# Patient Record
Sex: MALE | Race: WHITE | NOT HISPANIC OR LATINO | Employment: FULL TIME | URBAN - METROPOLITAN AREA
[De-identification: names, ages, dates, MRNs, and addresses within clinical notes are randomized per-mention and may not be internally consistent; named-entity substitution may affect disease eponyms.]

---

## 2019-06-28 ENCOUNTER — OFFICE VISIT (OUTPATIENT)
Dept: FAMILY MEDICINE CLINIC | Facility: CLINIC | Age: 29
End: 2019-06-28

## 2019-06-28 VITALS
WEIGHT: 225 LBS | OXYGEN SATURATION: 98 % | RESPIRATION RATE: 18 BRPM | TEMPERATURE: 98.3 F | SYSTOLIC BLOOD PRESSURE: 127 MMHG | BODY MASS INDEX: 30.48 KG/M2 | HEIGHT: 72 IN | HEART RATE: 60 BPM | DIASTOLIC BLOOD PRESSURE: 75 MMHG

## 2019-06-28 DIAGNOSIS — F43.10 PTSD (POST-TRAUMATIC STRESS DISORDER): ICD-10-CM

## 2019-06-28 DIAGNOSIS — F41.9 ANXIETY AND DEPRESSION: Primary | ICD-10-CM

## 2019-06-28 DIAGNOSIS — M54.42 CHRONIC LEFT-SIDED LOW BACK PAIN WITH LEFT-SIDED SCIATICA: ICD-10-CM

## 2019-06-28 DIAGNOSIS — F32.A ANXIETY AND DEPRESSION: Primary | ICD-10-CM

## 2019-06-28 DIAGNOSIS — G89.29 CHRONIC LEFT-SIDED LOW BACK PAIN WITH LEFT-SIDED SCIATICA: ICD-10-CM

## 2019-06-28 DIAGNOSIS — M54.32 SCIATIC NERVE PAIN, LEFT: ICD-10-CM

## 2019-06-28 PROBLEM — M54.9 BACK PAIN: Status: ACTIVE | Noted: 2018-05-16

## 2019-06-28 PROBLEM — K21.9 GERD (GASTROESOPHAGEAL REFLUX DISEASE): Status: ACTIVE | Noted: 2018-05-16

## 2019-06-28 PROCEDURE — 99214 OFFICE O/P EST MOD 30 MIN: CPT | Performed by: FAMILY MEDICINE

## 2019-06-28 RX ORDER — CITALOPRAM 40 MG/1
1 TABLET ORAL DAILY
Refills: 1 | COMMUNITY
Start: 2019-04-11 | End: 2019-06-28 | Stop reason: SDUPTHER

## 2019-06-28 RX ORDER — CITALOPRAM 40 MG/1
40 TABLET ORAL DAILY
Qty: 90 TABLET | Refills: 1 | Status: SHIPPED | OUTPATIENT
Start: 2019-06-28 | End: 2019-12-06 | Stop reason: SDUPTHER

## 2019-06-28 RX ORDER — MELOXICAM 15 MG/1
15 TABLET ORAL DAILY
Qty: 90 TABLET | Refills: 1 | Status: SHIPPED | OUTPATIENT
Start: 2019-06-28 | End: 2020-01-03

## 2019-06-28 RX ORDER — BUPROPION HYDROCHLORIDE 300 MG/1
1 TABLET ORAL DAILY
Refills: 5 | COMMUNITY
Start: 2019-05-20 | End: 2019-06-28 | Stop reason: SDUPTHER

## 2019-06-28 RX ORDER — CLONAZEPAM 1 MG/1
1 TABLET ORAL 3 TIMES DAILY PRN
COMMUNITY
End: 2019-06-28 | Stop reason: SDUPTHER

## 2019-06-28 RX ORDER — MELOXICAM 15 MG/1
1 TABLET ORAL DAILY
Refills: 3 | COMMUNITY
Start: 2019-05-20 | End: 2019-06-28 | Stop reason: SDUPTHER

## 2019-06-28 RX ORDER — BUPROPION HYDROCHLORIDE 300 MG/1
300 TABLET ORAL DAILY
Qty: 90 TABLET | Refills: 1 | Status: SHIPPED | OUTPATIENT
Start: 2019-06-28 | End: 2019-12-06

## 2019-06-28 RX ORDER — CLONAZEPAM 1 MG/1
1 TABLET ORAL 3 TIMES DAILY PRN
Qty: 60 TABLET | Refills: 3 | Status: SHIPPED | OUTPATIENT
Start: 2019-06-28 | End: 2019-12-06 | Stop reason: SDUPTHER

## 2019-06-28 NOTE — PROGRESS NOTES
Patient presents into office this day for a follow up on his anxiety and depression. Prior patient of [facility]. Records obtained. States that he ran out of his Clonazepam a long time ago. He has been out of his Wellbutrin x 1 week, and his other meds have been out for approx 2 months. PHQ 9 today resulted a score of 14.    Subjective   Micah Nettles is a 29 y.o. male.   Chief Complaint   Patient presents with   • Anxiety   • Depression         History of Present Illness     Micah presents for first time in about 1 year for follow-up on his anxiety and depression.  Since last visit with me he changed jobs and does not currently have insurance.  He is working a new job in [place] and is attempting to get HIP Medicaid.  He has recently run out and all of his medications was recently out of Wellbutrin possibly 1 week, Celexa, meloxicam he ran out about 2 months ago.  Klonopin has been quite a while ago.  He reports he is doing well set of medications.  He has had increased stress recently with his grandmother and poor health.  He is requesting to start medications as he was using previously.  He has graduated from court ordered counseling, he is continuing to go to counseling at [facility] for  PTSD.    He reports he continues to have pain in his left sciatic region, is requesting to restart meloxicam.    Patient Active Problem List    Diagnosis Date Noted   • Anxiety and depression 05/16/2018   • PTSD (post-traumatic stress disorder) 05/16/2018   • Back pain 05/16/2018   • GERD (gastroesophageal reflux disease) 05/16/2018           History reviewed. No pertinent surgical history.    Current Outpatient Medications:   •  clonazePAM (KlonoPIN) 1 MG tablet, Take 1 tablet by mouth 3 (Three) Times a Day As Needed for Anxiety., Disp: 60 tablet, Rfl: 3  •  EPINEPHrine (EPIPEN 2-BOO IJ), Inject 0.3 mg as directed Daily As Needed. Use as directed IM for bee stings, Disp: , Rfl:   •  buPROPion XL (WELLBUTRIN XL)  "300 MG 24 hr tablet, Take 1 tablet by mouth Daily., Disp: 90 tablet, Rfl: 1  •  citalopram (CeleXA) 40 MG tablet, Take 1 tablet by mouth Daily., Disp: 90 tablet, Rfl: 1  •  meloxicam (MOBIC) 15 MG tablet, Take 1 tablet by mouth Daily. 1 po qd with food, Disp: 90 tablet, Rfl: 1  Allergies   Allergen Reactions   • Bee Venom Anaphylaxis     Social History     Socioeconomic History   • Marital status: Single     Spouse name: Not on file   • Number of children: Not on file   • Years of education: Not on file   • Highest education level: Not on file   Tobacco Use   • Smoking status: Current Every Day Smoker     Packs/day: 0.25     Types: Cigarettes     Start date: 2007   • Smokeless tobacco: Never Used   Substance and Sexual Activity   • Alcohol use: No     Frequency: Never   • Drug use: No     Family History   Problem Relation Age of Onset   • Migraines Mother    • Diabetes Mother    • Alcohol abuse Father    • Hypertension Father    • Cervical cancer Sister      The following portions of the patient's history were reviewed and updated as appropriate: allergies, current medications, past family history, past medical history, past social history, past surgical history and problem list.    Review of Systems   Gastrointestinal: Negative for abdominal pain, nausea, vomiting and indigestion.   Musculoskeletal: Positive for arthralgias and back pain. Negative for gait problem and joint swelling.   Psychiatric/Behavioral: Positive for agitation, decreased concentration, dysphoric mood, sleep disturbance, depressed mood and stress. Negative for suicidal ideas. The patient is nervous/anxious.        Objective   /75 (BP Location: Left arm, Patient Position: Sitting)   Pulse 60   Temp 98.3 °F (36.8 °C) (Oral)   Resp 18   Ht 182.9 cm (72\")   Wt 102 kg (225 lb)   SpO2 98%   BMI 30.52 kg/m²   Physical Exam   Constitutional: He is oriented to person, place, and time. He appears well-developed and well-nourished. No " distress.   HENT:   Head: Normocephalic and atraumatic.   Eyes: Conjunctivae and EOM are normal. Pupils are equal, round, and reactive to light.   Neck: Normal range of motion. Neck supple. No thyromegaly present.   Cardiovascular: Normal rate and normal heart sounds. Exam reveals no gallop and no friction rub.   No murmur heard.  Pulmonary/Chest: No respiratory distress. He has no wheezes. He has no rales.   Musculoskeletal: He exhibits no edema or tenderness.   Lymphadenopathy:     He has no cervical adenopathy.   Neurological: He is alert and oriented to person, place, and time. Coordination normal.   Skin: Skin is warm and dry. No rash noted.   Psychiatric: He has a normal mood and affect. His behavior is normal. Judgment and thought content normal.         No results found for any previous visit.         Assessment/Plan   Diagnoses and all orders for this visit:    1. Anxiety and depression (Primary)    2. PTSD (post-traumatic stress disorder)    3. Chronic left-sided low back pain with left-sided sciatica    4. Sciatic nerve pain, left    Other orders  -     buPROPion XL (WELLBUTRIN XL) 300 MG 24 hr tablet; Take 1 tablet by mouth Daily.  Dispense: 90 tablet; Refill: 1  -     citalopram (CeleXA) 40 MG tablet; Take 1 tablet by mouth Daily.  Dispense: 90 tablet; Refill: 1  -     meloxicam (MOBIC) 15 MG tablet; Take 1 tablet by mouth Daily. 1 po qd with food  Dispense: 90 tablet; Refill: 1  -     clonazePAM (KlonoPIN) 1 MG tablet; Take 1 tablet by mouth 3 (Three) Times a Day As Needed for Anxiety.  Dispense: 60 tablet; Refill: 3      Restart medications           Return in about 3 months (around 9/28/2019) for Recheck.

## 2019-06-28 NOTE — PATIENT INSTRUCTIONS
Living With Post-Traumatic Stress Disorder  If you have been diagnosed with post-traumatic stress disorder (PTSD), you may be relieved that you now know why you have felt or behaved a certain way. Still, you may feel overwhelmed about the treatment ahead. You may also wonder how to get the support you need and how to deal with the condition day-to-day.  If you are living with PTSD, there are ways to help you recover from it and manage your symptoms.  How to manage lifestyle changes  Managing stress  Stress is your body's reaction to life changes and events, both good and bad. Stress can make PTSD worse. Take the following steps to cope with stress:  · Talk with your health care provider or a counselor if you would like to learn more about techniques to reduce your stress. He or she may suggest some stress reduction techniques such as:  ? Muscle relaxation exercises.  ? Regular exercise.  ? Meditation, yoga, or other mind-body exercises.  ? Breathing exercises.  ? Listening to quiet music.  ? Spending time outside.  · Maintain a healthy lifestyle. Eat a healthy diet, exercise regularly, get plenty of sleep, and take time to relax.  · Spend time with others. Talk with them about how you are feeling and what kind of support you need. Try to not isolate yourself, even though you may feel like doing that. Isolating yourself can delay your recovery.  · Do activities and hobbies that you enjoy.  · Pace yourself when doing stressful things. Take breaks, and reward yourself when you finish. Make sure that you do not overload your schedule.    Medicines  Your health care provider may suggest certain medicines if he or she feels that they will help to improve your condition. Antidepressants or antipsychotic medicines may be used to treat PTSD. Avoid using alcohol and other substances that may prevent your medicines from working properly (may interact). It is also important to:  · Talk with your pharmacist or health care  provider about all medicines that you take, their possible side effects, and which medicines are safe to take together.  · Make it your goal to take part in all treatment decisions (shared decision-making). Ask about possible side effects of medicines that your health care provider recommends, and tell him or her how you feel about having those side effects. It is best if shared decision-making with your health care provider is part of your total treatment plan.    If your health care provider prescribes a medicine, you may not notice the full benefits of it for 4-8 weeks. Most people who are treated for PTSD need to take medicine for at least 6-12 months after they feel better. If you are taking medicines as part of your treatment, do not stop taking medicines before you ask your health care provider if it is safe to stop. You may need to have the medicine slowly decreased (tapered) over time to lower the risk of harmful side effects.  Relationships  Many people who have PTSD have difficulty trusting others. Make an effort to:  · Take risks and develop trust with close friends and family members. Developing trust in others can help you feel safe and connect you with emotional support.  · Be open and honest about your feelings.  · Try to have fun and relax in safe spaces, such as with friends and family.  · Think about going to couples counseling, family education classes, or family therapy. Your loved ones may not always know how to be supportive. Therapy can be helpful for everyone.    How to recognize changes in your condition  Be aware of your symptoms and how often you have them. The following symptoms mean that you need to seek help for your PTSD:  · You feel suspicious and angry.  · You have repeated flashbacks.  · You avoid going out or being with others.  · You have an increasing number of fights with close friends or family members, such as your spouse.  · You have thoughts about hurting yourself or  others.  · You cannot get relief from feelings of depression or anxiety.    Where to find support  Talking to others  · Explain that PTSD is a mental health problem. It is something that a person can develop after experiencing or seeing a life-threatening event. Tell them that PTSD makes you feel stress like you did during the event.  · Talk to your loved ones about the symptoms you have. Also tell them what things or situations can cause symptoms to start (are triggers for you).  · Assure your loved ones that there are treatments to help PTSD. Discuss possibly seeking family therapy or couples therapy.  · If you are worried or fearful about seeking treatment, ask for support.  Finances  Not all insurance plans cover mental health care, so it is important to check with your insurance carrier. If paying for co-pays or counseling services is a problem, search for a local or [facility] mental health care center. Public mental health care services may be offered there at a low cost or no cost when you are not able to see a private health care provider. If you are a , contact a local veterans organization or [facility] for more information.  If you are taking medicine for PTSD, you may be able to get the genericform, which may be less expensive than brand-name medicine. Some makers of prescription medicines also offer help to patients who cannot afford the medicines that they need.  Community resources  · Find a support group in your community. Often, groups are available for  veterans, trauma victims, and family members or caregivers.  · Look into volunteer opportunities. Taking part in these can help you feel more connected to your community.  · Contact a local organization to find out if you are eligible for a service dog.  · Keep daily contact with at least one trusted friend or family member.  Follow these instructions at home:  Lifestyle  · Exercise regularly. Try to do 30 or more minutes of  physical activity on most days of the week.  · Try to get 7-9 hours of sleep each night. To help with sleep:  ? Keep your bedroom cool and dark.  ? Do not eat a heavy meal during the hour before you go to bed.  ? Do not drink alcohol or caffeinated drinks before bed.  ? Avoid screen time before bedtime. This means avoiding use of your TV, computer, tablet, and cell phone.  · Avoid using alcohol or drugs.  · Practice self-soothing skills and use them daily.  · Try to have fun and seek humor in your life.  General instructions  · If your PTSD is affecting your marriage or family, seek help from a family therapist.  · Take over-the-counter and prescription medicines only as told by your health care provider.  · Make sure to let all of your health care providers know that you have PTSD. This is especially important if you are having surgery or need to be admitted to the hospital.  · Keep all follow-up visits as told by your health care providers. This is important.  Where to find more information  Go to this website to find more information about PTSD, treatment for PTSD, and how to get support:  · National Center for PTSD: www.ptsd.va.gov    Contact a health care provider if:  · Your symptoms get worse or they do not get better.  Get help right away if:  · You have thoughts about hurting yourself or others.  If you ever feel like you may hurt yourself or others, or have thoughts about taking your own life, get help right away. You can go to your nearest emergency department or call:  · Your local emergency services (911 in the [place]  · A suicide crisis helpline, such as the National Suicide Prevention Lifeline at 1-567.212.9708. This is open 24-hours a day.    Summary  · If you are living with PTSD, there are ways to help you recover from it and manage your symptoms.  · Find supportive environments and people who understand PTSD. Spend time in those places, and maintain contact with those people.  · Work with your  health care team to create a management plan that includes counseling, stress management techniques, and healthy lifestyle habits.  This information is not intended to replace advice given to you by your health care provider. Make sure you discuss any questions you have with your health care provider.  Document Released: 04/19/2018 Document Revised: 04/19/2018 Document Reviewed: 04/19/2018  [employer] Interactive Patient Education © 2019 [employer]

## 2019-12-06 ENCOUNTER — OFFICE VISIT (OUTPATIENT)
Dept: FAMILY MEDICINE CLINIC | Facility: CLINIC | Age: 29
End: 2019-12-06

## 2019-12-06 VITALS
HEART RATE: 56 BPM | OXYGEN SATURATION: 98 % | HEIGHT: 72 IN | DIASTOLIC BLOOD PRESSURE: 90 MMHG | TEMPERATURE: 98.3 F | SYSTOLIC BLOOD PRESSURE: 139 MMHG | BODY MASS INDEX: 28.58 KG/M2 | WEIGHT: 211 LBS

## 2019-12-06 DIAGNOSIS — G89.29 CHRONIC LEFT-SIDED LOW BACK PAIN WITH LEFT-SIDED SCIATICA: ICD-10-CM

## 2019-12-06 DIAGNOSIS — F32.A ANXIETY AND DEPRESSION: Primary | ICD-10-CM

## 2019-12-06 DIAGNOSIS — F43.10 PTSD (POST-TRAUMATIC STRESS DISORDER): ICD-10-CM

## 2019-12-06 DIAGNOSIS — F41.9 ANXIETY AND DEPRESSION: Primary | ICD-10-CM

## 2019-12-06 DIAGNOSIS — M54.42 CHRONIC LEFT-SIDED LOW BACK PAIN WITH LEFT-SIDED SCIATICA: ICD-10-CM

## 2019-12-06 PROCEDURE — 99214 OFFICE O/P EST MOD 30 MIN: CPT | Performed by: FAMILY MEDICINE

## 2019-12-06 RX ORDER — CLONAZEPAM 1 MG/1
1 TABLET ORAL 3 TIMES DAILY PRN
Qty: 180 TABLET | Refills: 1 | Status: SHIPPED | OUTPATIENT
Start: 2019-12-06 | End: 2020-11-25 | Stop reason: SDUPTHER

## 2019-12-06 RX ORDER — CITALOPRAM 40 MG/1
40 TABLET ORAL DAILY
Qty: 90 TABLET | Refills: 1 | Status: SHIPPED | OUTPATIENT
Start: 2019-12-06 | End: 2020-11-25 | Stop reason: SDUPTHER

## 2019-12-06 RX ORDER — BUPROPION HYDROCHLORIDE 150 MG/1
150 TABLET, EXTENDED RELEASE ORAL 2 TIMES DAILY
Qty: 180 TABLET | Refills: 3 | Status: SHIPPED | OUTPATIENT
Start: 2019-12-06 | End: 2020-11-25 | Stop reason: ALTCHOICE

## 2020-01-02 DIAGNOSIS — G89.29 CHRONIC LEFT-SIDED LOW BACK PAIN WITH LEFT-SIDED SCIATICA: ICD-10-CM

## 2020-01-02 DIAGNOSIS — M54.42 CHRONIC LEFT-SIDED LOW BACK PAIN WITH LEFT-SIDED SCIATICA: ICD-10-CM

## 2020-01-03 RX ORDER — MELOXICAM 15 MG/1
TABLET ORAL
Qty: 90 TABLET | Refills: 3 | Status: SHIPPED | OUTPATIENT
Start: 2020-01-03 | End: 2020-11-25 | Stop reason: SDUPTHER

## 2020-11-25 ENCOUNTER — OFFICE VISIT (OUTPATIENT)
Dept: FAMILY MEDICINE CLINIC | Facility: CLINIC | Age: 30
End: 2020-11-25

## 2020-11-25 VITALS
HEART RATE: 63 BPM | OXYGEN SATURATION: 97 % | TEMPERATURE: 98.7 F | BODY MASS INDEX: 28.31 KG/M2 | DIASTOLIC BLOOD PRESSURE: 77 MMHG | SYSTOLIC BLOOD PRESSURE: 127 MMHG | WEIGHT: 209 LBS | HEIGHT: 72 IN

## 2020-11-25 DIAGNOSIS — F32.A ANXIETY AND DEPRESSION: Primary | ICD-10-CM

## 2020-11-25 DIAGNOSIS — K21.9 GASTROESOPHAGEAL REFLUX DISEASE, UNSPECIFIED WHETHER ESOPHAGITIS PRESENT: ICD-10-CM

## 2020-11-25 DIAGNOSIS — M54.42 CHRONIC LEFT-SIDED LOW BACK PAIN WITH LEFT-SIDED SCIATICA: ICD-10-CM

## 2020-11-25 DIAGNOSIS — Z23 NEED FOR INFLUENZA VACCINATION: ICD-10-CM

## 2020-11-25 DIAGNOSIS — R11.0 NAUSEA: ICD-10-CM

## 2020-11-25 DIAGNOSIS — F41.9 ANXIETY AND DEPRESSION: Primary | ICD-10-CM

## 2020-11-25 DIAGNOSIS — F43.10 PTSD (POST-TRAUMATIC STRESS DISORDER): ICD-10-CM

## 2020-11-25 DIAGNOSIS — G89.29 CHRONIC LEFT-SIDED LOW BACK PAIN WITH LEFT-SIDED SCIATICA: ICD-10-CM

## 2020-11-25 PROCEDURE — 90471 IMMUNIZATION ADMIN: CPT | Performed by: FAMILY MEDICINE

## 2020-11-25 PROCEDURE — 99214 OFFICE O/P EST MOD 30 MIN: CPT | Performed by: FAMILY MEDICINE

## 2020-11-25 PROCEDURE — 90686 IIV4 VACC NO PRSV 0.5 ML IM: CPT | Performed by: FAMILY MEDICINE

## 2020-11-25 RX ORDER — ONDANSETRON 4 MG/1
4 TABLET, FILM COATED ORAL EVERY 8 HOURS PRN
Qty: 30 TABLET | Refills: 1 | Status: SHIPPED | OUTPATIENT
Start: 2020-11-25 | End: 2021-07-13 | Stop reason: SDUPTHER

## 2020-11-25 RX ORDER — BUPROPION HYDROCHLORIDE 150 MG/1
TABLET ORAL
Qty: 180 TABLET | Refills: 0 | Status: SHIPPED | OUTPATIENT
Start: 2020-11-25 | End: 2021-02-26 | Stop reason: SDUPTHER

## 2020-11-25 RX ORDER — FAMOTIDINE 40 MG/1
40 TABLET, FILM COATED ORAL DAILY
Qty: 90 TABLET | Refills: 3 | Status: SHIPPED | OUTPATIENT
Start: 2020-11-25 | End: 2021-02-26 | Stop reason: SDUPTHER

## 2020-11-25 RX ORDER — MELOXICAM 15 MG/1
15 TABLET ORAL DAILY
Qty: 90 TABLET | Refills: 3 | Status: SHIPPED | OUTPATIENT
Start: 2020-11-25 | End: 2021-08-02

## 2020-11-25 RX ORDER — BUPROPION HYDROCHLORIDE 150 MG/1
150 TABLET, EXTENDED RELEASE ORAL 2 TIMES DAILY
Qty: 180 TABLET | Refills: 3 | Status: CANCELLED | OUTPATIENT
Start: 2020-11-25

## 2020-11-25 RX ORDER — CLONAZEPAM 1 MG/1
1 TABLET ORAL 3 TIMES DAILY PRN
Qty: 135 TABLET | Refills: 1 | Status: SHIPPED | OUTPATIENT
Start: 2020-11-25 | End: 2021-07-13 | Stop reason: SDUPTHER

## 2020-11-25 RX ORDER — SUCRALFATE 1 G/1
1 TABLET ORAL 4 TIMES DAILY
Qty: 360 TABLET | Refills: 0 | Status: SHIPPED | OUTPATIENT
Start: 2020-11-25 | End: 2021-09-14 | Stop reason: SDUPTHER

## 2020-11-25 RX ORDER — CITALOPRAM 40 MG/1
40 TABLET ORAL DAILY
Qty: 90 TABLET | Refills: 1 | Status: SHIPPED | OUTPATIENT
Start: 2020-11-25 | End: 2021-07-13 | Stop reason: SDUPTHER

## 2020-11-25 NOTE — PROGRESS NOTES
Chief Complaint   Patient presents with   • Anxiety       Subjective     Micah Wes Nettles is a 30 y.o. male. Patient here to follow up on anxiety, states has been out of medications for about 2 months, due to no health insurance and is asking to restart medicines.  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  He is mostly working by himself at a computer, currently working 10-hour days 50 hours a week.  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  But he was still have flares and is requesting a prescription for meloxicam.    Thought was doing ok with his anxiety, however approximately 2 weeks after running out of medication, anxiety increased significantly, states he is currently always on edge and over thinking everything.  Patient has completed court ordered counseling, has been misinformed that he could continue counseling after he completed the program and is currently financially responsible for 2 additional visits.  He does not plan to return to counseling at this time.    Patient complaining of when he wakes up in the morning he is feeling nauseated, and that has been going on for about a month. Doesn't know if this could be due to his anxiety and being out of medications or if anything else was going on.  He is worried he could be developing an ulcer.  He was told in the hospital he may have had an ulcer but did not complete the work-up.  PHQ-9 Depression Screening  Little interest or pleasure in doing things? 3   Feeling down, depressed, or hopeless? 1   Trouble falling or staying asleep, or sleeping too much? 3   Feeling tired or having little energy? 3   Poor appetite or overeating? 2   Feeling bad about yourself - or that you are a failure or have let yourself or your family down? 1   Trouble concentrating on things, such as reading the newspaper or watching television? 0   Moving or speaking so slowly  that other people could have noticed? Or the opposite - being so fidgety or restless that you have been moving around a lot more than usual? 0   Thoughts that you would be better off dead, or of hurting yourself in some way? 1   PHQ-9 Total Score 14   If you checked off any problems, how difficult have these problems made it for you to do your work, take care of things at home, or get along with other people? Very difficult         The following portions of the patient's history were reviewed and updated as appropriate: allergies, current medications, past family history, past medical history, past social history, past surgical history and problem list.    Current Outpatient Medications on File Prior to Visit   Medication Sig   • EPINEPHrine (EPIPEN 2-BOO IJ) Inject 0.3 mg as directed Daily As Needed. Use as directed IM for bee stings     No current facility-administered medications on file prior to visit.      Medications Discontinued During This Encounter   Medication Reason   • buPROPion SR (WELLBUTRIN SR) 150 MG 12 hr tablet Alternate therapy   • clonazePAM (KlonoPIN) 1 MG tablet Reorder   • citalopram (CeleXA) 40 MG tablet Reorder   • meloxicam (MOBIC) 15 MG tablet Reorder       Review of Systems   Constitutional: Negative for unexpected weight loss.   Respiratory: Negative for cough and shortness of breath.    Cardiovascular: Negative for chest pain and palpitations.   Gastrointestinal: Negative for abdominal pain, nausea, vomiting and indigestion.   Musculoskeletal: Positive for arthralgias and back pain. Negative for gait problem and joint swelling.   Neurological: Negative for weakness.   Psychiatric/Behavioral: Positive for agitation, decreased concentration, dysphoric mood, sleep disturbance, depressed mood and stress. Negative for suicidal ideas. The patient is nervous/anxious.         Objective   Vitals:    11/25/20 1127   BP: 127/77   BP Location: Left arm   Patient Position: Sitting   Cuff Size: Adult  "  Pulse: 63   Temp: 98.7 °F (37.1 °C)   TempSrc: Infrared   SpO2: 97%   Weight: 94.8 kg (209 lb)   Height: 182.9 cm (72.01\")      Body mass index is 28.34 kg/m².    Physical Exam        No results found for: HGBA1C     Procedures     Assessment/Plan   Diagnoses and all orders for this visit:    1. Anxiety and depression (Primary)  -     citalopram (CeleXA) 40 MG tablet; Take 1 tablet by mouth Daily.  Dispense: 90 tablet; Refill: 1  -     clonazePAM (KlonoPIN) 1 MG tablet; Take 1 tablet by mouth 3 (Three) Times a Day As Needed for Anxiety.  Dispense: 135 tablet; Refill: 1  -     buPROPion XL (Wellbutrin XL) 150 MG 24 hr tablet; 1 daily for 3 days then 2 daily  Dispense: 180 tablet; Refill: 0    2. PTSD (post-traumatic stress disorder)  -     citalopram (CeleXA) 40 MG tablet; Take 1 tablet by mouth Daily.  Dispense: 90 tablet; Refill: 1  -     clonazePAM (KlonoPIN) 1 MG tablet; Take 1 tablet by mouth 3 (Three) Times a Day As Needed for Anxiety.  Dispense: 135 tablet; Refill: 1  -     buPROPion XL (Wellbutrin XL) 150 MG 24 hr tablet; 1 daily for 3 days then 2 daily  Dispense: 180 tablet; Refill: 0    3. Chronic left-sided low back pain with left-sided sciatica  -     meloxicam (MOBIC) 15 MG tablet; Take 1 tablet by mouth Daily. with food.  Dispense: 90 tablet; Refill: 3    4. Gastroesophageal reflux disease, unspecified whether esophagitis present  -     famotidine (Pepcid) 40 MG tablet; Take 1 tablet by mouth Daily.  Dispense: 90 tablet; Refill: 3  -     sucralfate (Carafate) 1 g tablet; Take 1 tablet by mouth 4 (Four) Times a Day.  Dispense: 360 tablet; Refill: 0    5. Nausea  -     ondansetron (Zofran) 4 MG tablet; Take 1 tablet by mouth Every 8 (Eight) Hours As Needed for Nausea or Vomiting.  Dispense: 30 tablet; Refill: 1  -     sucralfate (Carafate) 1 g tablet; Take 1 tablet by mouth 4 (Four) Times a Day.  Dispense: 360 tablet; Refill: 0    6. Need for influenza vaccination  -     Fluarix/Fluzone/Afluria Quad>6 " Months    Other orders  -     Cancel: buPROPion SR (Wellbutrin SR) 150 MG 12 hr tablet; Take 1 tablet by mouth 2 (Two) Times a Day.  Dispense: 180 tablet; Refill: 3      Restarting antidepressants with Wellbutrin 150 for 3 days then increase to 300 mg daily, changing to XR version as this is been more helpful than the SR version that he was more recently taken.  After 1 week restart Celexa at half dose for 3 to 10 days until any GI side effects  (diarrhea when started previously ) resolved and then increase to 40 mg daily.  Starting Pepcid for GERD and Carafate for the possibility of gastric ulcer, advised if GI symptoms do not quickly improve may need referral to GI for EGD.  Advised to avoid all over-the-counter NSAIDs and only use meloxicam sparingly if it does not increase nausea or stomach discomfort.    Medications Discontinued During This Encounter   Medication Reason   • buPROPion SR (WELLBUTRIN SR) 150 MG 12 hr tablet Alternate therapy   • clonazePAM (KlonoPIN) 1 MG tablet Reorder   • citalopram (CeleXA) 40 MG tablet Reorder   • meloxicam (MOBIC) 15 MG tablet Reorder          Return in about 3 months (around 2/25/2021) for Recheck, depression, anxiety reflux.  For as needed increased or new symptoms.  Education reference material relevant to visit available in AVS through [employer] or printed copy given.

## 2021-02-26 ENCOUNTER — OFFICE VISIT (OUTPATIENT)
Dept: FAMILY MEDICINE CLINIC | Facility: CLINIC | Age: 31
End: 2021-02-26

## 2021-02-26 VITALS
HEART RATE: 77 BPM | HEIGHT: 72 IN | OXYGEN SATURATION: 97 % | TEMPERATURE: 97.1 F | BODY MASS INDEX: 28.99 KG/M2 | WEIGHT: 214 LBS | SYSTOLIC BLOOD PRESSURE: 125 MMHG | RESPIRATION RATE: 16 BRPM | DIASTOLIC BLOOD PRESSURE: 74 MMHG

## 2021-02-26 DIAGNOSIS — L91.8 SKIN TAGS, MULTIPLE ACQUIRED: ICD-10-CM

## 2021-02-26 DIAGNOSIS — F43.10 PTSD (POST-TRAUMATIC STRESS DISORDER): ICD-10-CM

## 2021-02-26 DIAGNOSIS — F41.9 ANXIETY AND DEPRESSION: Primary | ICD-10-CM

## 2021-02-26 DIAGNOSIS — G89.29 CHRONIC LEFT-SIDED LOW BACK PAIN WITH LEFT-SIDED SCIATICA: ICD-10-CM

## 2021-02-26 DIAGNOSIS — K21.9 GASTROESOPHAGEAL REFLUX DISEASE, UNSPECIFIED WHETHER ESOPHAGITIS PRESENT: ICD-10-CM

## 2021-02-26 DIAGNOSIS — L98.9 SKIN LESION: ICD-10-CM

## 2021-02-26 DIAGNOSIS — F32.A ANXIETY AND DEPRESSION: Primary | ICD-10-CM

## 2021-02-26 DIAGNOSIS — R10.13 EPIGASTRIC PAIN: ICD-10-CM

## 2021-02-26 DIAGNOSIS — M54.42 CHRONIC LEFT-SIDED LOW BACK PAIN WITH LEFT-SIDED SCIATICA: ICD-10-CM

## 2021-02-26 PROCEDURE — 99214 OFFICE O/P EST MOD 30 MIN: CPT | Performed by: FAMILY MEDICINE

## 2021-02-26 RX ORDER — BUPROPION HYDROCHLORIDE 450 MG/1
TABLET, FILM COATED, EXTENDED RELEASE ORAL
Qty: 30 TABLET | Refills: 3 | Status: SHIPPED | OUTPATIENT
Start: 2021-02-26 | End: 2021-07-13 | Stop reason: SDUPTHER

## 2021-02-26 RX ORDER — OMEPRAZOLE 40 MG/1
40 CAPSULE, DELAYED RELEASE ORAL DAILY
Qty: 30 CAPSULE | Refills: 3 | Status: SHIPPED | OUTPATIENT
Start: 2021-02-26 | End: 2021-07-13 | Stop reason: SDUPTHER

## 2021-02-26 RX ORDER — FAMOTIDINE 40 MG/1
40 TABLET, FILM COATED ORAL DAILY
Qty: 30 TABLET | Refills: 3 | Status: SHIPPED | OUTPATIENT
Start: 2021-02-26 | End: 2021-07-13 | Stop reason: SDUPTHER

## 2021-02-26 NOTE — PROGRESS NOTES
Chief Complaint   Patient presents with   • Depression   • Anxiety   • Heartburn   • Cyst   • Back Pain       Subjective     Micah Wes Nettles is a 30 y.o. male.  He is presenting today to follow up on depression, anxiety, reflux, back pain and spot on his belt line.  At last visit 3 months ago we restarted Wellbutrin and he has titrated up to 150 mg two daily for total dose of 300 mg daily he states this is helping somewhat with depression and significantly with anxiety.  Would like to change this to once daily dosing.  Week later restarted Celexa which has been increased to 40 mg daily.       GERD and possible gastric ulcer, started on Pepcid and Carafate and advised to avoid all over-the-counter NSAIDs and only use meloxicam sparingly.  He states he has not been taking meloxicam at all, back pain has pretty much resolved for the present time.   Patient states his reflux is worse. He states he is not eating due to it. He almost lost his job because of it.  Reviewing medications he states he has not been taking Pepcid or any other antacids.  He will take Tums on occasion on bad days up to 6 in a day, would have taken more if someone had told him he could cause a kidney stone.     He has also noticed spots below belt line for a while. He thought it first it was an ingrown hair, took tweezers and it ended up pulling the whole lesion off and it bled quite a bit but did not find a hair.  States they have multiplied.       PHQ-9 Depression Screening  Little interest or pleasure in doing things? 3   Feeling down, depressed, or hopeless? 2   Trouble falling or staying asleep, or sleeping too much? 3   Feeling tired or having little energy? 3   Poor appetite or overeating? 3   Feeling bad about yourself - or that you are a failure or have let yourself or your family down? 3   Trouble concentrating on things, such as reading the newspaper or watching television? 0   Moving or speaking so slowly that other people could  have noticed? Or the opposite - being so fidgety or restless that you have been moving around a lot more than usual? 3   Thoughts that you would be better off dead, or of hurting yourself in some way? 0   PHQ-9 Total Score 20   If you checked off any problems, how difficult have these problems made it for you to do your work, take care of things at home, or get along with other people? Very difficult         The following portions of the patient's history were reviewed and updated as appropriate: allergies, current medications, past family history, past medical history, past social history, past surgical history and problem list.    Current Outpatient Medications on File Prior to Visit   Medication Sig   • citalopram (CeleXA) 40 MG tablet Take 1 tablet by mouth Daily.   • clonazePAM (KlonoPIN) 1 MG tablet Take 1 tablet by mouth 3 (Three) Times a Day As Needed for Anxiety.   • ondansetron (Zofran) 4 MG tablet Take 1 tablet by mouth Every 8 (Eight) Hours As Needed for Nausea or Vomiting.   • sucralfate (Carafate) 1 g tablet Take 1 tablet by mouth 4 (Four) Times a Day.   • [DISCONTINUED] buPROPion XL (Wellbutrin XL) 150 MG 24 hr tablet 1 daily for 3 days then 2 daily   • EPINEPHrine (EPIPEN 2-BOO IJ) Inject 0.3 mg as directed Daily As Needed. Use as directed IM for bee stings   • meloxicam (MOBIC) 15 MG tablet Take 1 tablet by mouth Daily. with food.   • [DISCONTINUED] famotidine (Pepcid) 40 MG tablet Take 1 tablet by mouth Daily.     No current facility-administered medications on file prior to visit.      Medications Discontinued During This Encounter   Medication Reason   • buPROPion XL (Wellbutrin XL) 150 MG 24 hr tablet Reorder   • famotidine (Pepcid) 40 MG tablet Reorder       Review of Systems     Objective   Vitals:    02/26/21 1526   BP: 125/74   BP Location: Left arm   Patient Position: Sitting   Cuff Size: Large Adult   Pulse: 77   Resp: 16   Temp: 97.1 °F (36.2 °C)   TempSrc: Infrared   SpO2: 97%   Weight:  "97.1 kg (214 lb)   Height: 182.9 cm (72.01\")   PainSc: 0-No pain      Body mass index is 29.02 kg/m².    Physical Exam  Vitals signs and nursing note reviewed.   Constitutional:       General: He is not in acute distress.     Appearance: He is well-developed.   HENT:      Head: Normocephalic and atraumatic.   Eyes:      Pupils: Pupils are equal, round, and reactive to light.   Cardiovascular:      Rate and Rhythm: Regular rhythm.      Heart sounds: No murmur.   Pulmonary:      Breath sounds: Normal breath sounds. No wheezing or rales.   Abdominal:      Comments: Very tender in epigastrium into somewhat lesser degree left and right upper quadrants.  Voluntary guarding, no rebound exam triggered nausea.   Skin:     General: Skin is warm and dry.      Findings: No rash.      Comments: 4 subcentimeter light tan crusted lesions on the mons pubis.  Left lateral groin with a similar nondisrupted lesion that is consistent with a wide-based skin tag.   Neurological:      Mental Status: He is alert and oriented to person, place, and time.   Psychiatric:         Mood and Affect: Mood normal.         Behavior: Behavior normal.         Thought Content: Thought content normal.         Judgment: Judgment normal.             No results found for: HGBA1C     Procedures     Assessment/Plan   Diagnoses and all orders for this visit:    1. Anxiety and depression (Primary)  -     buPROPion XL (FORFIVO XL) 450 MG 24 hr tablet; 1 DAILY  Dispense: 30 tablet; Refill: 3    2. PTSD (post-traumatic stress disorder)  -     buPROPion XL (FORFIVO XL) 450 MG 24 hr tablet; 1 DAILY  Dispense: 30 tablet; Refill: 3    3. Gastroesophageal reflux disease, unspecified whether esophagitis present  -     famotidine (Pepcid) 40 MG tablet; Take 1 tablet by mouth Daily.  Dispense: 30 tablet; Refill: 3  -     omeprazole (priLOSEC) 40 MG capsule; Take 1 capsule by mouth Daily.  Dispense: 30 capsule; Refill: 3    4. Chronic left-sided low back pain with " left-sided sciatica    5. Skin lesion  -     mupirocin (Bactroban) 2 % ointment; Apply  topically to the appropriate area as directed 3 (Three) Times a Day. To skin infection  Dispense: 15 g; Refill: 1    6. Skin tags, multiple acquired    7. Epigastric pain        Depression and anxiety, anxiety improved but depression worsening.  Increasing Wellbutrin from 300 to 450 daily continue Celexa and Klonopin as needed.  Epigastric abdominal pain and nausea worsened.  Possible gastric ulcer without evidence of GI bleeding.  Patient somehow did not start Pepcid, starting both Pepcid and Prilosec and continuing Carafate at this time.  If symptoms are somewhat starting to improve in the next couple of weeks would strongly recommend referral to GI for EGD.  Advised if any evidence of GI bleeding needs urgent evaluation.  Have written a note for work explaining situation.  If symptoms do not improve with appropriate treatment would also recommend lab work including CBC and H. pylori testing.  Chronic back pain improved.  New problem of skin lesions in the groin appear to be wide based skin tags that have been injured by shaving.  Differential could include shaving bumps or condyloma.  Advised to cleanse area daily with soap and water stop shaving the area, prescription for Bactroban to use twice daily and advised to use benzyl peroxide over-the-counter twice daily if return or continue to multiply would refer to dermatologist for excision or other treatment.      Medications Discontinued During This Encounter   Medication Reason   • buPROPion XL (Wellbutrin XL) 150 MG 24 hr tablet Reorder   • famotidine (Pepcid) 40 MG tablet Reorder          Return in about 3 weeks (around 3/19/2021) for Recheck, depression EPIGASTRIC PAIN OR prn.  For as needed increased or new symptoms.  Education reference material relevant to visit available in AVS through [employer] or printed copy given.

## 2021-07-13 DIAGNOSIS — F43.10 PTSD (POST-TRAUMATIC STRESS DISORDER): ICD-10-CM

## 2021-07-13 DIAGNOSIS — F32.A ANXIETY AND DEPRESSION: ICD-10-CM

## 2021-07-13 DIAGNOSIS — R11.0 NAUSEA: ICD-10-CM

## 2021-07-13 DIAGNOSIS — K21.9 GASTROESOPHAGEAL REFLUX DISEASE, UNSPECIFIED WHETHER ESOPHAGITIS PRESENT: ICD-10-CM

## 2021-07-13 DIAGNOSIS — F41.9 ANXIETY AND DEPRESSION: ICD-10-CM

## 2021-07-13 RX ORDER — ONDANSETRON 4 MG/1
4 TABLET, FILM COATED ORAL EVERY 8 HOURS PRN
Qty: 30 TABLET | Refills: 1 | Status: SHIPPED | OUTPATIENT
Start: 2021-07-13 | End: 2021-09-14 | Stop reason: SDUPTHER

## 2021-07-13 RX ORDER — CLONAZEPAM 1 MG/1
1 TABLET ORAL 3 TIMES DAILY PRN
Qty: 135 TABLET | Refills: 0 | Status: SHIPPED | OUTPATIENT
Start: 2021-07-13 | End: 2021-12-15

## 2021-07-13 RX ORDER — CITALOPRAM 40 MG/1
40 TABLET ORAL DAILY
Qty: 90 TABLET | Refills: 1 | Status: SHIPPED | OUTPATIENT
Start: 2021-07-13 | End: 2021-08-02 | Stop reason: SINTOL

## 2021-07-13 RX ORDER — BUPROPION HYDROCHLORIDE 450 MG/1
TABLET, FILM COATED, EXTENDED RELEASE ORAL
Qty: 30 TABLET | Refills: 3 | Status: SHIPPED | OUTPATIENT
Start: 2021-07-13 | End: 2021-08-02 | Stop reason: SDUPTHER

## 2021-07-13 RX ORDER — FAMOTIDINE 40 MG/1
40 TABLET, FILM COATED ORAL DAILY
Qty: 30 TABLET | Refills: 3 | Status: SHIPPED | OUTPATIENT
Start: 2021-07-13 | End: 2021-09-14 | Stop reason: SDUPTHER

## 2021-07-13 RX ORDER — OMEPRAZOLE 40 MG/1
40 CAPSULE, DELAYED RELEASE ORAL DAILY
Qty: 30 CAPSULE | Refills: 3 | Status: SHIPPED | OUTPATIENT
Start: 2021-07-13 | End: 2021-09-14 | Stop reason: SDUPTHER

## 2021-08-02 ENCOUNTER — OFFICE VISIT (OUTPATIENT)
Dept: FAMILY MEDICINE CLINIC | Facility: CLINIC | Age: 31
End: 2021-08-02

## 2021-08-02 VITALS
SYSTOLIC BLOOD PRESSURE: 128 MMHG | TEMPERATURE: 97.1 F | HEART RATE: 67 BPM | WEIGHT: 235.8 LBS | OXYGEN SATURATION: 98 % | RESPIRATION RATE: 16 BRPM | BODY MASS INDEX: 31.94 KG/M2 | HEIGHT: 72 IN | DIASTOLIC BLOOD PRESSURE: 82 MMHG

## 2021-08-02 DIAGNOSIS — F41.9 ANXIETY AND DEPRESSION: ICD-10-CM

## 2021-08-02 DIAGNOSIS — L30.9 ECZEMA, UNSPECIFIED TYPE: ICD-10-CM

## 2021-08-02 DIAGNOSIS — F43.10 PTSD (POST-TRAUMATIC STRESS DISORDER): ICD-10-CM

## 2021-08-02 DIAGNOSIS — R10.13 EPIGASTRIC PAIN: ICD-10-CM

## 2021-08-02 DIAGNOSIS — L29.9 PRURITUS: ICD-10-CM

## 2021-08-02 DIAGNOSIS — F32.A ANXIETY AND DEPRESSION: ICD-10-CM

## 2021-08-02 DIAGNOSIS — K21.9 GASTROESOPHAGEAL REFLUX DISEASE, UNSPECIFIED WHETHER ESOPHAGITIS PRESENT: Primary | ICD-10-CM

## 2021-08-02 PROCEDURE — 99214 OFFICE O/P EST MOD 30 MIN: CPT | Performed by: FAMILY MEDICINE

## 2021-08-02 RX ORDER — HYDROXYZINE PAMOATE 25 MG/1
25 CAPSULE ORAL 3 TIMES DAILY PRN
Qty: 30 CAPSULE | Refills: 0 | Status: SHIPPED | OUTPATIENT
Start: 2021-08-02 | End: 2022-09-20

## 2021-08-02 RX ORDER — BUPROPION HYDROCHLORIDE 450 MG/1
TABLET, FILM COATED, EXTENDED RELEASE ORAL
Qty: 30 TABLET | Refills: 3 | Status: SHIPPED | OUTPATIENT
Start: 2021-08-02 | End: 2021-12-15

## 2021-08-02 NOTE — PROGRESS NOTES
Chief Complaint  Heartburn and depression    Subjective    Micah Wes Nettles presents today for follow up on heartburn and depression.    He states he's been exercising and taking medications as prescribed he feels it's improved, but still has nausea daily.  He has increased cigarette use.  He is continuing to take both Prilosec and Pepcid daily however one of the 2 he is getting over-the-counter as it is less expensive.  He is not taking 2 daily of the over the counter to equal the prescription strength.    Depression and anxiety.  Had significant episode of anxiety before his appointment today.  Currently only taking Celexa causing ED which caused a mistrust in relation meredith and girlfriend left him.  He had stopped taking Wellbutrin due to cost.    Heartburn  He complains of belching, heartburn and nausea. He reports no choking or no dysphagia. This is a chronic problem. The current episode started more than 1 month ago. The problem occurs constantly. The problem has been gradually improving. The heartburn wakes him from sleep. The symptoms are aggravated by certain foods and caffeine.     He is also reporting rash and itching on his upper arms, initially thought was eczema, now wonders if he is having allergic reaction to sun exposure.  Over-the-counter hydrocortisone has not been helpful.  Will become very itchy at times.    Current Outpatient Medications on File Prior to Visit   Medication Sig   • clonazePAM (KlonoPIN) 1 MG tablet Take 1 tablet by mouth 3 (Three) Times a Day As Needed for Anxiety.   • EPINEPHrine (EPIPEN 2-BOO IJ) Inject 0.3 mg as directed Daily As Needed. Use as directed IM for bee stings   • famotidine (Pepcid) 40 MG tablet Take 1 tablet by mouth Daily.   • omeprazole (priLOSEC) 40 MG capsule Take 1 capsule by mouth Daily.   • ondansetron (Zofran) 4 MG tablet Take 1 tablet by mouth Every 8 (Eight) Hours As Needed for Nausea or Vomiting.   • [DISCONTINUED] buPROPion XL (FORFIVO XL) 450 MG 24  "hr tablet 1 DAILY   • [DISCONTINUED] citalopram (CeleXA) 40 MG tablet Take 1 tablet by mouth Daily.   • sucralfate (Carafate) 1 g tablet Take 1 tablet by mouth 4 (Four) Times a Day.   • [DISCONTINUED] meloxicam (MOBIC) 15 MG tablet Take 1 tablet by mouth Daily. with food.   • [DISCONTINUED] mupirocin (Bactroban) 2 % ointment Apply  topically to the appropriate area as directed 3 (Three) Times a Day. To skin infection     No current facility-administered medications on file prior to visit.       Objective   Vital Signs:   /82   Pulse 67   Temp 97.1 °F (36.2 °C)   Resp 16   Ht 182.9 cm (72\")   Wt 107 kg (235 lb 12.8 oz)   SpO2 98%   BMI 31.98 kg/m²     Physical Exam  Vitals and nursing note reviewed.   Constitutional:       General: He is not in acute distress.     Appearance: He is well-developed.   HENT:      Head: Normocephalic and atraumatic.   Eyes:      Pupils: Pupils are equal, round, and reactive to light.   Cardiovascular:      Rate and Rhythm: Regular rhythm.      Heart sounds: No murmur heard.     Pulmonary:      Breath sounds: Normal breath sounds. No wheezing or rales.   Abdominal:      Comments: Tender to palpation in epigastrium    Skin:     General: Skin is warm and dry.      Findings: No rash.      Comments: Numerous very small 1 to 2 mm red papules on upper arms.  There are some mild excoriations.   Neurological:      Mental Status: He is alert and oriented to person, place, and time.   Psychiatric:         Mood and Affect: Mood normal.         Behavior: Behavior normal.         Thought Content: Thought content normal.         Judgment: Judgment normal.            Assessment and Plan    Diagnoses and all orders for this visit:    1. Gastroesophageal reflux disease, unspecified whether esophagitis present (Primary)  -     Ambulatory Referral to Gastroenterology    2. Anxiety and depression  -     buPROPion XL (FORFIVO XL) 450 MG 24 hr tablet; 1 DAILY  Dispense: 30 tablet; Refill: 3    3. " PTSD (post-traumatic stress disorder)  -     buPROPion XL (FORFIVO XL) 450 MG 24 hr tablet; 1 DAILY  Dispense: 30 tablet; Refill: 3    4. Epigastric pain  -     Ambulatory Referral to Gastroenterology    5. Eczema, unspecified type  -     halobetasol (Ultravate) 0.05 % cream; Apply  topically to the appropriate area as directed 2 (Two) Times a Day.  Dispense: 50 g; Refill: 1  -     hydrOXYzine pamoate (Vistaril) 25 MG capsule; Take 1 capsule by mouth 3 (Three) Times a Day As Needed for Itching, Allergies or Anxiety.  Dispense: 30 capsule; Refill: 0    6. Pruritus  -     hydrOXYzine pamoate (Vistaril) 25 MG capsule; Take 1 capsule by mouth 3 (Three) Times a Day As Needed for Itching, Allergies or Anxiety.  Dispense: 30 capsule; Refill: 0        Refractory GERD.  Advised over-the-counter H2 blockers and PPIs are half-strength needs to increase whichever 1 he is getting over-the-counter to 40 mg daily and take 40 mg of both Prilosec and Pepcid.  Reviewed reflux precautions which she has actually been trying to follow fairly well.  Did encourage to try to reduce and stop smoking.  Referring to GI for further evaluation.    Depression and anxiety with erectile dysfunction caused by Celexa, he will reduce this to 20 mg for 1 week then discontinue.  We are restarting Wellbutrin 450 mg start 1/2 tablet for the first 4 days or longer than full tablet daily.    Pruritic rash possibly eczema or allergic reaction.  Prescription for Ultravate topically and Vistaril as needed.  Did warn about significant sedation risk and to take in the evenings if there is sedation.    Medications Discontinued During This Encounter   Medication Reason   • citalopram (CeleXA) 40 MG tablet Side effects   • meloxicam (MOBIC) 15 MG tablet *Therapy completed   • buPROPion XL (FORFIVO XL) 450 MG 24 hr tablet Reorder   • mupirocin (Bactroban) 2 % ointment *Therapy completed         Follow Up     Return in about 4 weeks (around 8/30/2021) for Recheck  depression and anxiety and pruritus.    Patient was given instructions and counseling regarding his condition or for health maintenance advice. Please see specific information pulled into the AVS if appropriate.

## 2021-09-14 ENCOUNTER — OFFICE VISIT (OUTPATIENT)
Dept: FAMILY MEDICINE CLINIC | Facility: CLINIC | Age: 31
End: 2021-09-14

## 2021-09-14 VITALS
DIASTOLIC BLOOD PRESSURE: 80 MMHG | TEMPERATURE: 97.5 F | BODY MASS INDEX: 31.02 KG/M2 | OXYGEN SATURATION: 98 % | RESPIRATION RATE: 18 BRPM | WEIGHT: 229 LBS | SYSTOLIC BLOOD PRESSURE: 118 MMHG | HEIGHT: 72 IN | HEART RATE: 83 BPM

## 2021-09-14 DIAGNOSIS — R11.0 NAUSEA: ICD-10-CM

## 2021-09-14 DIAGNOSIS — R10.13 EPIGASTRIC PAIN: ICD-10-CM

## 2021-09-14 DIAGNOSIS — F41.9 ANXIETY AND DEPRESSION: Primary | ICD-10-CM

## 2021-09-14 DIAGNOSIS — L29.9 PRURITUS: ICD-10-CM

## 2021-09-14 DIAGNOSIS — F32.A ANXIETY AND DEPRESSION: Primary | ICD-10-CM

## 2021-09-14 DIAGNOSIS — F43.10 PTSD (POST-TRAUMATIC STRESS DISORDER): ICD-10-CM

## 2021-09-14 DIAGNOSIS — K21.9 GASTROESOPHAGEAL REFLUX DISEASE, UNSPECIFIED WHETHER ESOPHAGITIS PRESENT: ICD-10-CM

## 2021-09-14 PROBLEM — E66.811 CLASS 1 OBESITY DUE TO EXCESS CALORIES WITH SERIOUS COMORBIDITY AND BODY MASS INDEX (BMI) OF 32.0 TO 32.9 IN ADULT: Status: ACTIVE | Noted: 2021-09-14

## 2021-09-14 PROBLEM — E66.09 CLASS 1 OBESITY DUE TO EXCESS CALORIES WITH SERIOUS COMORBIDITY AND BODY MASS INDEX (BMI) OF 32.0 TO 32.9 IN ADULT: Status: ACTIVE | Noted: 2021-09-14

## 2021-09-14 PROCEDURE — 99215 OFFICE O/P EST HI 40 MIN: CPT | Performed by: FAMILY MEDICINE

## 2021-09-14 RX ORDER — DESVENLAFAXINE SUCCINATE 50 MG/1
50 TABLET, EXTENDED RELEASE ORAL DAILY
Qty: 30 TABLET | Refills: 3 | Status: SHIPPED | OUTPATIENT
Start: 2021-09-14 | End: 2022-02-21

## 2021-09-14 RX ORDER — FAMOTIDINE 40 MG/1
40 TABLET, FILM COATED ORAL DAILY
Qty: 30 TABLET | Refills: 3 | Status: SHIPPED | OUTPATIENT
Start: 2021-09-14 | End: 2022-09-20

## 2021-09-14 RX ORDER — OMEPRAZOLE 40 MG/1
40 CAPSULE, DELAYED RELEASE ORAL DAILY
Qty: 30 CAPSULE | Refills: 3 | Status: SHIPPED | OUTPATIENT
Start: 2021-09-14 | End: 2022-03-14

## 2021-09-14 RX ORDER — SUCRALFATE 1 G/1
1 TABLET ORAL 4 TIMES DAILY
Qty: 360 TABLET | Refills: 0 | Status: SHIPPED | OUTPATIENT
Start: 2021-09-14 | End: 2022-09-20

## 2021-09-14 RX ORDER — ONDANSETRON 4 MG/1
4 TABLET, FILM COATED ORAL EVERY 8 HOURS PRN
Qty: 30 TABLET | Refills: 5 | Status: SHIPPED | OUTPATIENT
Start: 2021-09-14 | End: 2021-12-16 | Stop reason: SDUPTHER

## 2021-09-14 NOTE — PROGRESS NOTES
Chief Complaint  Depression, Anxiety, Skin Problem, and Abdominal Pain     Subjective    Micah Wes Nettles presents today for  Depression  Visit Type: follow-up  Patient presents with the following symptoms: depressed mood.  Patient is not experiencing: suicidal ideas and suicidal planning.  Frequency of symptoms: most days   Severity: interfering with daily activities   Sleep quality: poor  Nighttime awakenings: several  Compliance with medications:  %        Anxiety  Presents for follow-up visit. Symptoms include depressed mood and nausea. Patient reports no suicidal ideas. Symptoms occur most days. The severity of symptoms is interfering with daily activities. The quality of sleep is poor. Nighttime awakenings: several.     His past medical history is significant for depression. Compliance with medications is %.   Abdominal Pain  This is a chronic problem. The current episode started more than 1 year ago. The onset quality is gradual. The problem occurs intermittently. The problem has been gradually worsening. The pain is located in the epigastric region and suprapubic region. The pain is at a severity of 10/10. The quality of the pain is sharp. The abdominal pain does not radiate. Associated symptoms include belching, diarrhea, flatus, nausea and vomiting. The pain is aggravated by eating. The pain is relieved by nothing. The treatment provided no relief. Prior diagnostic workup includes GI consult.   Rash  This is a chronic problem. The current episode started more than 1 month ago. The problem has been gradually improving since onset. The affected locations include the right arm and left arm. The rash is characterized by itchiness, blistering and pain. Associated symptoms include diarrhea and vomiting. Past treatments include topical steroids. The treatment provided significant relief.     Patient reports he has been able to get multiple prescriptions from the pharmacy and is not currently taking  "omeprazole or Carafate he is taking Pepcid but I think to get this over-the-counter.  He states he is continuing to have significant GI symptoms of abdominal pain and other reflux symptoms.  He states at least 1 day a week he is having significant symptoms making it hard for him to go to work and is asking for Replen[employer] papers to be completed to preserve his job.  He does have appointment with GI but this is not scheduled until October 13.    He reports since stopping citalopram he has noticed he has a shorter fuse, and more irritable.  He does state that previous erectile dysfunction while taking citalopram has resolved.  Have been changed to 2nd shift, stressful.             Current Outpatient Medications on File Prior to Visit   Medication Sig   • buPROPion XL (FORFIVO XL) 450 MG 24 hr tablet 1 DAILY   • clonazePAM (KlonoPIN) 1 MG tablet Take 1 tablet by mouth 3 (Three) Times a Day As Needed for Anxiety.   • EPINEPHrine (EPIPEN 2-BOO IJ) Inject 0.3 mg as directed Daily As Needed. Use as directed IM for bee stings   • halobetasol (Ultravate) 0.05 % cream Apply  topically to the appropriate area as directed 2 (Two) Times a Day.   • hydrOXYzine pamoate (Vistaril) 25 MG capsule Take 1 capsule by mouth 3 (Three) Times a Day As Needed for Itching, Allergies or Anxiety.   • [DISCONTINUED] ondansetron (Zofran) 4 MG tablet Take 1 tablet by mouth Every 8 (Eight) Hours As Needed for Nausea or Vomiting.   • [DISCONTINUED] famotidine (Pepcid) 40 MG tablet Take 1 tablet by mouth Daily.   • [DISCONTINUED] omeprazole (priLOSEC) 40 MG capsule Take 1 capsule by mouth Daily.   • [DISCONTINUED] sucralfate (Carafate) 1 g tablet Take 1 tablet by mouth 4 (Four) Times a Day.     No current facility-administered medications on file prior to visit.       Objective   Vital Signs:   /80 (BP Location: Left arm, Patient Position: Sitting, Cuff Size: Large Adult)   Pulse 83   Temp 97.5 °F (36.4 °C) (Temporal)   Resp 18   Ht 182.9 cm (72\") "   Wt 104 kg (229 lb)   SpO2 98%   BMI 31.06 kg/m²     Physical Exam  Vitals and nursing note reviewed.   Constitutional:       General: He is not in acute distress.     Appearance: He is well-developed.   HENT:      Head: Normocephalic and atraumatic.   Abdominal:      Comments: Tender to palpation in epigastrium    Skin:     General: Skin is warm and dry.      Findings: No rash.      Comments: Previous rash on bilateral arms nearly completely resolved.   Neurological:      Mental Status: He is alert and oriented to person, place, and time.   Psychiatric:         Mood and Affect: Mood normal.         Behavior: Behavior normal.         Thought Content: Thought content normal.         Judgment: Judgment normal.           No results found for: HGBA1C             Assessment and Plan    Diagnoses and all orders for this visit:    1. Anxiety and depression (Primary)  -     desvenlafaxine (Pristiq) 50 MG 24 hr tablet; Take 1 tablet by mouth Daily.  Dispense: 30 tablet; Refill: 3    2. Pruritus    3. Gastroesophageal reflux disease, unspecified whether esophagitis present  -     sucralfate (Carafate) 1 g tablet; Take 1 tablet by mouth 4 (Four) Times a Day.  Dispense: 360 tablet; Refill: 0  -     omeprazole (priLOSEC) 40 MG capsule; Take 1 capsule by mouth Daily.  Dispense: 30 capsule; Refill: 3  -     famotidine (Pepcid) 40 MG tablet; Take 1 tablet by mouth Daily.  Dispense: 30 tablet; Refill: 3    4. Nausea  -     sucralfate (Carafate) 1 g tablet; Take 1 tablet by mouth 4 (Four) Times a Day.  Dispense: 360 tablet; Refill: 0  -     ondansetron (Zofran) 4 MG tablet; Take 1 tablet by mouth Every 8 (Eight) Hours As Needed for Nausea or Vomiting.  Dispense: 30 tablet; Refill: 5    5. PTSD (post-traumatic stress disorder)    6. Epigastric pain      Epigastric pain nausea vomiting diarrhea bloating.  Restarting omeprazole, and Carafate continue Pepcid and Zofran as needed.  Completed [facility] paperwork.  Keep appointment with GI  as scheduled next month, suspect will need EGD if symptoms are persisting.    Depression and anxiety increased symptoms since stopping citalopram.  We will add Pristiq        Medications Discontinued During This Encounter   Medication Reason   • sucralfate (Carafate) 1 g tablet Reorder   • ondansetron (Zofran) 4 MG tablet Reorder   • famotidine (Pepcid) 40 MG tablet Reorder   • omeprazole (priLOSEC) 40 MG capsule Reorder       I spent 40 minutes caring for Micah on this date of service. This time includes time spent by me in the following activities:preparing for the visit, reviewing tests, obtaining and/or reviewing a separately obtained history, performing a medically appropriate examination and/or evaluation , counseling and educating the patient/family/caregiver, ordering medications, tests, or procedures, documenting information in the medical record and Completing FMLA form  Follow Up     Return in about 4 weeks (around 10/12/2021) for Recheck anxiety.    Patient was given instructions and counseling regarding his condition or for health maintenance advice. Please see specific information pulled into the AVS if appropriate.

## 2021-10-13 ENCOUNTER — OFFICE (OUTPATIENT)
Dept: RURAL CLINIC 3 | Facility: CLINIC | Age: 31
End: 2021-10-13

## 2021-10-13 VITALS
DIASTOLIC BLOOD PRESSURE: 76 MMHG | SYSTOLIC BLOOD PRESSURE: 126 MMHG | HEART RATE: 58 BPM | HEIGHT: 72 IN | WEIGHT: 229 LBS

## 2021-10-13 DIAGNOSIS — R10.13 EPIGASTRIC PAIN: ICD-10-CM

## 2021-10-13 DIAGNOSIS — K21.9 GASTRO-ESOPHAGEAL REFLUX DISEASE WITHOUT ESOPHAGITIS: ICD-10-CM

## 2021-10-13 PROCEDURE — 99203 OFFICE O/P NEW LOW 30 MIN: CPT | Performed by: NURSE PRACTITIONER

## 2021-10-13 RX ORDER — DICYCLOMINE HYDROCHLORIDE 20 MG/1
40 TABLET ORAL
Qty: 60 | Refills: 11 | Status: ACTIVE
Start: 2021-10-13

## 2021-10-26 PROBLEM — R10.9 ABDOMINAL PAIN: Status: ACTIVE | Noted: 2021-10-26

## 2021-12-08 ENCOUNTER — ON CAMPUS - OUTPATIENT (OUTPATIENT)
Dept: RURAL HOSPITAL 3 | Facility: HOSPITAL | Age: 31
End: 2021-12-08

## 2021-12-08 DIAGNOSIS — R19.7 DIARRHEA, UNSPECIFIED: ICD-10-CM

## 2021-12-08 DIAGNOSIS — K62.5 HEMORRHAGE OF ANUS AND RECTUM: ICD-10-CM

## 2021-12-08 DIAGNOSIS — R11.2 NAUSEA WITH VOMITING, UNSPECIFIED: ICD-10-CM

## 2021-12-08 DIAGNOSIS — K64.1 SECOND DEGREE HEMORRHOIDS: ICD-10-CM

## 2021-12-08 DIAGNOSIS — R10.13 EPIGASTRIC PAIN: ICD-10-CM

## 2021-12-08 PROCEDURE — 45380 COLONOSCOPY AND BIOPSY: CPT | Performed by: INTERNAL MEDICINE

## 2021-12-08 PROCEDURE — 43235 EGD DIAGNOSTIC BRUSH WASH: CPT | Performed by: INTERNAL MEDICINE

## 2021-12-15 DIAGNOSIS — F41.9 ANXIETY AND DEPRESSION: ICD-10-CM

## 2021-12-15 DIAGNOSIS — F32.A ANXIETY AND DEPRESSION: ICD-10-CM

## 2021-12-15 DIAGNOSIS — F43.10 PTSD (POST-TRAUMATIC STRESS DISORDER): ICD-10-CM

## 2021-12-15 RX ORDER — BUPROPION HYDROCHLORIDE 450 MG/1
TABLET, FILM COATED, EXTENDED RELEASE ORAL
Qty: 30 TABLET | Refills: 0 | Status: SHIPPED | OUTPATIENT
Start: 2021-12-15 | End: 2021-12-16 | Stop reason: SDUPTHER

## 2021-12-15 RX ORDER — CLONAZEPAM 1 MG/1
TABLET ORAL
Qty: 90 TABLET | Refills: 0 | Status: SHIPPED | OUTPATIENT
Start: 2021-12-15 | End: 2021-12-16 | Stop reason: SDUPTHER

## 2021-12-15 NOTE — TELEPHONE ENCOUNTER
Please inform patient I am renewing his medications as requested but he is past due for follow-up on his anxiety, please have him schedule an appointment.

## 2021-12-16 DIAGNOSIS — F43.10 PTSD (POST-TRAUMATIC STRESS DISORDER): ICD-10-CM

## 2021-12-16 DIAGNOSIS — F41.9 ANXIETY AND DEPRESSION: ICD-10-CM

## 2021-12-16 DIAGNOSIS — F32.A ANXIETY AND DEPRESSION: ICD-10-CM

## 2021-12-16 DIAGNOSIS — R11.0 NAUSEA: ICD-10-CM

## 2021-12-16 RX ORDER — CLONAZEPAM 1 MG/1
1 TABLET ORAL 3 TIMES DAILY PRN
Qty: 90 TABLET | Refills: 0 | Status: SHIPPED | OUTPATIENT
Start: 2021-12-16 | End: 2022-03-26 | Stop reason: SDUPTHER

## 2021-12-16 RX ORDER — BUPROPION HYDROCHLORIDE 450 MG/1
TABLET, FILM COATED, EXTENDED RELEASE ORAL
Qty: 30 TABLET | Refills: 0 | Status: SHIPPED | OUTPATIENT
Start: 2021-12-16 | End: 2022-01-11 | Stop reason: SDUPTHER

## 2021-12-16 RX ORDER — ONDANSETRON 4 MG/1
4 TABLET, FILM COATED ORAL EVERY 8 HOURS PRN
Qty: 30 TABLET | Refills: 5 | Status: SHIPPED | OUTPATIENT
Start: 2021-12-16 | End: 2022-01-11 | Stop reason: SDUPTHER

## 2021-12-16 NOTE — TELEPHONE ENCOUNTER
Inform patient I have renewed medications as requested but need him to schedule follow-up appointment prior to additional refills.

## 2022-01-11 ENCOUNTER — OFFICE VISIT (OUTPATIENT)
Dept: FAMILY MEDICINE CLINIC | Facility: CLINIC | Age: 32
End: 2022-01-11

## 2022-01-11 ENCOUNTER — TELEPHONE (OUTPATIENT)
Dept: FAMILY MEDICINE CLINIC | Facility: CLINIC | Age: 32
End: 2022-01-11

## 2022-01-11 VITALS
HEART RATE: 88 BPM | BODY MASS INDEX: 30.61 KG/M2 | TEMPERATURE: 98.7 F | RESPIRATION RATE: 15 BRPM | OXYGEN SATURATION: 98 % | HEIGHT: 72 IN | WEIGHT: 226 LBS | DIASTOLIC BLOOD PRESSURE: 75 MMHG | SYSTOLIC BLOOD PRESSURE: 130 MMHG

## 2022-01-11 DIAGNOSIS — F41.9 ANXIETY AND DEPRESSION: Primary | ICD-10-CM

## 2022-01-11 DIAGNOSIS — F32.A ANXIETY AND DEPRESSION: Primary | ICD-10-CM

## 2022-01-11 DIAGNOSIS — F43.10 PTSD (POST-TRAUMATIC STRESS DISORDER): ICD-10-CM

## 2022-01-11 DIAGNOSIS — K21.9 GASTROESOPHAGEAL REFLUX DISEASE, UNSPECIFIED WHETHER ESOPHAGITIS PRESENT: ICD-10-CM

## 2022-01-11 DIAGNOSIS — R11.0 NAUSEA: ICD-10-CM

## 2022-01-11 DIAGNOSIS — M54.6 ACUTE RIGHT-SIDED THORACIC BACK PAIN: ICD-10-CM

## 2022-01-11 PROCEDURE — 99214 OFFICE O/P EST MOD 30 MIN: CPT | Performed by: FAMILY MEDICINE

## 2022-01-11 RX ORDER — BUPROPION HYDROCHLORIDE 450 MG/1
TABLET, FILM COATED, EXTENDED RELEASE ORAL
Qty: 30 TABLET | Refills: 0 | Status: SHIPPED | OUTPATIENT
Start: 2022-01-11 | End: 2022-06-22 | Stop reason: ALTCHOICE

## 2022-01-11 RX ORDER — ONDANSETRON HYDROCHLORIDE 8 MG/1
TABLET, FILM COATED ORAL
Qty: 18 TABLET | Refills: 5 | Status: SHIPPED | OUTPATIENT
Start: 2022-01-11 | End: 2022-08-26 | Stop reason: SDUPTHER

## 2022-01-11 RX ORDER — METAXALONE 800 MG/1
800 TABLET ORAL 3 TIMES DAILY PRN
Qty: 30 TABLET | Refills: 1 | Status: SHIPPED | OUTPATIENT
Start: 2022-01-11 | End: 2022-05-20 | Stop reason: SDUPTHER

## 2022-01-11 RX ORDER — MELOXICAM 15 MG/1
15 TABLET ORAL DAILY
Qty: 30 TABLET | Refills: 2 | Status: SHIPPED | OUTPATIENT
Start: 2022-01-11 | End: 2022-12-28

## 2022-01-11 NOTE — PROGRESS NOTES
Answers for HPI/ROS submitted by the patient on 1/4/2022  What is the primary reason for your visit?: Other  Please describe your symptoms.: Anxiety and depression and nausea  Have you had these symptoms before?: Yes  How long have you been having these symptoms?: Greater than 2 weeks    Chief Complaint  Anxiety    History of Present Illness  Micahelvis Nettles presents today for a follow up on his Anxiety patient states that he is still taking his buPROPion XL (FORFIVO XL) 450 MG. Patient reports no changed that he feels like the medication is still working well for him.   Did have EGD and Colonoscopy no concerns reported on exam he has not heard results of biopsies  Have follow up with [facility] next week, still with daily nausea.       Current Outpatient Medications on File Prior to Visit   Medication Sig   • clonazePAM (KlonoPIN) 1 MG tablet Take 1 tablet by mouth 3 (Three) Times a Day As Needed for Anxiety. for anxiety   • desvenlafaxine (Pristiq) 50 MG 24 hr tablet Take 1 tablet by mouth Daily.   • EPINEPHrine (EPIPEN 2-BOO IJ) Inject 0.3 mg as directed Daily As Needed. Use as directed IM for bee stings   • famotidine (Pepcid) 40 MG tablet Take 1 tablet by mouth Daily.   • halobetasol (Ultravate) 0.05 % cream Apply  topically to the appropriate area as directed 2 (Two) Times a Day.   • hydrOXYzine pamoate (Vistaril) 25 MG capsule Take 1 capsule by mouth 3 (Three) Times a Day As Needed for Itching, Allergies or Anxiety.   • omeprazole (priLOSEC) 40 MG capsule Take 1 capsule by mouth Daily.   • sucralfate (Carafate) 1 g tablet Take 1 tablet by mouth 4 (Four) Times a Day.   • [DISCONTINUED] ondansetron (Zofran) 4 MG tablet Take 1 tablet by mouth Every 8 (Eight) Hours As Needed for Nausea or Vomiting.   • [DISCONTINUED] buPROPion XL (FORFIVO XL) 450 MG 24 hr tablet Take 1 tablet by mouth once daily     No current facility-administered medications on file prior to visit.       Objective   Vital Signs:   /75    "Pulse 88   Temp 98.7 °F (37.1 °C) (Temporal)   Resp 15   Ht 182.9 cm (72\")   Wt 103 kg (226 lb)   SpO2 98%   BMI 30.65 kg/m²       Physical Exam  Vitals and nursing note reviewed.   Constitutional:       General: He is not in acute distress.     Appearance: Normal appearance. He is well-developed.   HENT:      Head: Normocephalic and atraumatic.   Eyes:      Conjunctiva/sclera: Conjunctivae normal.      Pupils: Pupils are equal, round, and reactive to light.   Neck:      Thyroid: No thyromegaly.      Vascular: No JVD.   Cardiovascular:      Rate and Rhythm: Normal rate and regular rhythm.      Heart sounds: Normal heart sounds. No murmur heard.      Pulmonary:      Breath sounds: Normal breath sounds. No wheezing or rales.   Musculoskeletal:         General: Normal range of motion.      Cervical back: Normal range of motion.   Lymphadenopathy:      Cervical: No cervical adenopathy.   Skin:     General: Skin is warm and dry.      Findings: No rash.   Neurological:      Mental Status: He is alert and oriented to person, place, and time.   Psychiatric:         Mood and Affect: Mood normal.         Behavior: Behavior normal.            No visits with results within 1 Day(s) from this visit.   Latest known visit with results is:   No results found for any previous visit.             No results found for: HGBA1C             Assessment and Plan    Diagnoses and all orders for this visit:    1. Anxiety and depression (Primary)  -     buPROPion XL (FORFIVO XL) 450 MG 24 hr tablet; Take 1 tablet by mouth once daily  Dispense: 30 tablet; Refill: 0    2. PTSD (post-traumatic stress disorder)  -     buPROPion XL (FORFIVO XL) 450 MG 24 hr tablet; Take 1 tablet by mouth once daily  Dispense: 30 tablet; Refill: 0    3. Gastroesophageal reflux disease, unspecified whether esophagitis present    4. Nausea  -     ondansetron (Zofran) 8 MG tablet; 1/2 to 1 tablet every 8 hours if needed  Dispense: 18 tablet; Refill: 5    5. Acute " right-sided thoracic back pain  -     meloxicam (MOBIC) 15 MG tablet; Take 1 tablet by mouth Daily.  Dispense: 30 tablet; Refill: 2  -     metaxalone (Skelaxin) 800 MG tablet; Take 1 tablet by mouth 3 (Three) Times a Day As Needed for Muscle Spasms.  Dispense: 30 tablet; Refill: 1      Pt state he needs PA on bupropion has been out of med for over 1 month, pristiq plus Bupropion has been best combination.  Without bupropion anxiety and lack of motivation have prevented him from going anywhere other than work , not even grocery . Increased anxiety and worry and poor motivation. Pristiq is causing same sexual Side effects as SSRI.  Work on PA for bupropion, once insured can have on a routine basis likely wean off Pristiq.    GERD with persistent nausea, there is a quantity limit on Zofran, patient is needing to take 4 mg premeds every day.  Will change to 8 mg tomorrow, the quantity limit and he will take half tablet.  He is to keep follow-up with [facility].    Medications Discontinued During This Encounter   Medication Reason   • ondansetron (Zofran) 4 MG tablet Reorder   • buPROPion XL (FORFIVO XL) 450 MG 24 hr tablet Reorder         Follow Up     Return in about 3 months (around 4/11/2022).    Patient was given instructions and counseling regarding his condition or for health maintenance advice. Please see specific information pulled into the AVS if appropriate.

## 2022-01-14 NOTE — TELEPHONE ENCOUNTER
PA for medication was denied. Called to speak with patient. Informed patient of the determination. Asked the patient what he would like to do if he would like to try something else. Patient said no and hung up.

## 2022-02-20 DIAGNOSIS — F41.9 ANXIETY AND DEPRESSION: ICD-10-CM

## 2022-02-20 DIAGNOSIS — F32.A ANXIETY AND DEPRESSION: ICD-10-CM

## 2022-02-21 RX ORDER — DESVENLAFAXINE SUCCINATE 50 MG/1
TABLET, EXTENDED RELEASE ORAL
Qty: 30 TABLET | Refills: 2 | Status: SHIPPED | OUTPATIENT
Start: 2022-02-21 | End: 2022-06-22 | Stop reason: ALTCHOICE

## 2022-03-12 DIAGNOSIS — K21.9 GASTROESOPHAGEAL REFLUX DISEASE, UNSPECIFIED WHETHER ESOPHAGITIS PRESENT: ICD-10-CM

## 2022-03-14 RX ORDER — OMEPRAZOLE 40 MG/1
CAPSULE, DELAYED RELEASE ORAL
Qty: 30 CAPSULE | Refills: 12 | Status: SHIPPED | OUTPATIENT
Start: 2022-03-14 | End: 2022-09-20

## 2022-03-26 DIAGNOSIS — F41.9 ANXIETY AND DEPRESSION: ICD-10-CM

## 2022-03-26 DIAGNOSIS — F32.A ANXIETY AND DEPRESSION: ICD-10-CM

## 2022-03-26 DIAGNOSIS — F43.10 PTSD (POST-TRAUMATIC STRESS DISORDER): ICD-10-CM

## 2022-03-28 RX ORDER — CLONAZEPAM 1 MG/1
1 TABLET ORAL 3 TIMES DAILY PRN
Qty: 90 TABLET | Refills: 0 | Status: SHIPPED | OUTPATIENT
Start: 2022-03-28 | End: 2022-06-22 | Stop reason: SDUPTHER

## 2022-05-20 ENCOUNTER — TELEPHONE (OUTPATIENT)
Dept: FAMILY MEDICINE CLINIC | Facility: CLINIC | Age: 32
End: 2022-05-20

## 2022-05-20 ENCOUNTER — OFFICE VISIT (OUTPATIENT)
Dept: FAMILY MEDICINE CLINIC | Facility: CLINIC | Age: 32
End: 2022-05-20

## 2022-05-20 VITALS
DIASTOLIC BLOOD PRESSURE: 83 MMHG | BODY MASS INDEX: 30.56 KG/M2 | HEART RATE: 82 BPM | RESPIRATION RATE: 18 BRPM | HEIGHT: 72 IN | TEMPERATURE: 98.3 F | SYSTOLIC BLOOD PRESSURE: 133 MMHG | OXYGEN SATURATION: 99 % | WEIGHT: 225.6 LBS

## 2022-05-20 DIAGNOSIS — F90.2 ATTENTION DEFICIT HYPERACTIVITY DISORDER (ADHD), COMBINED TYPE: ICD-10-CM

## 2022-05-20 DIAGNOSIS — F41.9 ANXIETY AND DEPRESSION: ICD-10-CM

## 2022-05-20 DIAGNOSIS — F32.A ANXIETY AND DEPRESSION: ICD-10-CM

## 2022-05-20 DIAGNOSIS — M54.50 ACUTE RIGHT-SIDED LOW BACK PAIN, UNSPECIFIED WHETHER SCIATICA PRESENT: Primary | ICD-10-CM

## 2022-05-20 DIAGNOSIS — E66.09 CLASS 1 OBESITY DUE TO EXCESS CALORIES WITHOUT SERIOUS COMORBIDITY WITH BODY MASS INDEX (BMI) OF 32.0 TO 32.9 IN ADULT: ICD-10-CM

## 2022-05-20 DIAGNOSIS — S39.012A LOW BACK STRAIN, INITIAL ENCOUNTER: ICD-10-CM

## 2022-05-20 DIAGNOSIS — F41.0 PANIC ANXIETY SYNDROME: ICD-10-CM

## 2022-05-20 DIAGNOSIS — F40.10 SOCIAL ANXIETY DISORDER: ICD-10-CM

## 2022-05-20 PROCEDURE — 99215 OFFICE O/P EST HI 40 MIN: CPT | Performed by: FAMILY MEDICINE

## 2022-05-20 RX ORDER — DEXTROAMPHETAMINE SACCHARATE, AMPHETAMINE ASPARTATE MONOHYDRATE, DEXTROAMPHETAMINE SULFATE AND AMPHETAMINE SULFATE 5; 5; 5; 5 MG/1; MG/1; MG/1; MG/1
20 CAPSULE, EXTENDED RELEASE ORAL EVERY MORNING
Qty: 30 CAPSULE | Refills: 0 | Status: SHIPPED | OUTPATIENT
Start: 2022-05-20 | End: 2022-05-20 | Stop reason: SDUPTHER

## 2022-05-20 RX ORDER — DEXTROAMPHETAMINE SACCHARATE, AMPHETAMINE ASPARTATE MONOHYDRATE, DEXTROAMPHETAMINE SULFATE AND AMPHETAMINE SULFATE 5; 5; 5; 5 MG/1; MG/1; MG/1; MG/1
20 CAPSULE, EXTENDED RELEASE ORAL EVERY MORNING
Qty: 30 CAPSULE | Refills: 0 | Status: SHIPPED | OUTPATIENT
Start: 2022-05-20 | End: 2022-06-08 | Stop reason: DRUGHIGH

## 2022-05-20 RX ORDER — METAXALONE 800 MG/1
800 TABLET ORAL 3 TIMES DAILY PRN
Qty: 30 TABLET | Refills: 1 | Status: SHIPPED | OUTPATIENT
Start: 2022-05-20 | End: 2022-12-28

## 2022-05-20 NOTE — TELEPHONE ENCOUNTER
Caller: Micah Nettles    Relationship: Self    Best call back number: [phone]    Requested Prescriptions:   Requested Prescriptions     Pending Prescriptions Disp Refills   • amphetamine-dextroamphetamine XR (ADDERALL XR) 20 MG 24 hr capsule 30 capsule 0     Sig: Take 1 capsule by mouth Every Morning        Pharmacy where request should be sent: [id number] - [address] [facility] [phone] [place] [phone] FX     Additional details provided by patient: PATIENT SAID HE SENT IT TO [facility] AND THEY DO NOT HAVE ANY.  HE WOULD LIKE IT TO GO TO [facility].     Does the patient have less than a 3 day supply:  [x] Yes  [] No    Maryann Scott Rep   05/20/22 16:00 EDT

## 2022-05-20 NOTE — ASSESSMENT & PLAN NOTE
Patient's (Body mass index is 30.6 kg/m².) indicates that they are obese (BMI >30) with health conditions that include none . Weight is unchanged. BMI is is above average; BMI management plan is completed. We discussed portion control, increasing exercise and stimulant med for ADHD.

## 2022-05-20 NOTE — PROGRESS NOTES
Chief Complaint  Back Pain   Answers for HPI/ROS submitted by the patient on 5/14/2022  What is the primary reason for your visit?: Back Pain  Chronicity: new  Onset: in the past 7 days  Frequency: 2 to 4 times per day  Progression since onset: gradually worsening  Pain location: lumbar spine, sacro-iliac  Pain quality: aching, shooting  Radiates to: right thigh  Pain - numeric: 9/10  Pain is: worse during the day  Aggravated by: position, sitting, standing, stress  Stiffness is present: all day  leg pain: Yes  pelvic pain: Yes  Risk factors: lack of exercise, obesity      History of Present Illness  Micah Nettles presents today for lower right side back pain. Started again this week patient states that he has had back pain in the past. He has been taking ibuprofen to help with the pain. He states that the pain varies. He has had trouble walking and sometimes sitting and standing. Patient states that the pain will be in his lower right side and will wrap around towards his groin.    1st 3 days nearly constant, now less persistant and not as severe. Walking on [place] and up steal stairs all day at work, on feet all day at work. Have to go back to work today but off for the weekend. Do take ibuprofen have reduce to 3 at a time 2-3 times a day. Do have history of stomack issues and is agrevated with the meds.     Have Rx for Meloxicam but have not been able to pick it up.   Stopped other meds except occasional Klonipin and Pecid. Had been having morning nausea every day. GI sx stopped with out antidepresants.          Current Outpatient Medications on File Prior to Visit   Medication Sig   • clonazePAM (KlonoPIN) 1 MG tablet Take 1 tablet by mouth 3 (Three) Times a Day As Needed for Anxiety. for anxiety   • buPROPion XL (FORFIVO XL) 450 MG 24 hr tablet Take 1 tablet by mouth once daily   • desvenlafaxine (PRISTIQ) 50 MG 24 hr tablet Take 1 tablet by mouth once daily   • EPINEPHrine (EPIPEN 2-BOO IJ) Inject 0.3  "mg as directed Daily As Needed. Use as directed IM for bee stings   • famotidine (Pepcid) 40 MG tablet Take 1 tablet by mouth Daily.   • halobetasol (Ultravate) 0.05 % cream Apply  topically to the appropriate area as directed 2 (Two) Times a Day.   • hydrOXYzine pamoate (Vistaril) 25 MG capsule Take 1 capsule by mouth 3 (Three) Times a Day As Needed for Itching, Allergies or Anxiety.   • meloxicam (MOBIC) 15 MG tablet Take 1 tablet by mouth Daily.   • omeprazole (priLOSEC) 40 MG capsule Take 1 capsule by mouth once daily   • ondansetron (Zofran) 8 MG tablet 1/2 to 1 tablet every 8 hours if needed   • sucralfate (Carafate) 1 g tablet Take 1 tablet by mouth 4 (Four) Times a Day.   • [DISCONTINUED] metaxalone (Skelaxin) 800 MG tablet Take 1 tablet by mouth 3 (Three) Times a Day As Needed for Muscle Spasms.     No current facility-administered medications on file prior to visit.       Objective   Vital Signs:   /83 (BP Location: Right arm, Patient Position: Sitting, Cuff Size: Large Adult)   Pulse 82   Temp 98.3 °F (36.8 °C)   Resp 18   Ht 182.9 cm (72\")   Wt 102 kg (225 lb 9.6 oz)   SpO2 99%   BMI 30.60 kg/m²       Physical Exam  Vitals and nursing note reviewed.   Constitutional:       General: He is not in acute distress.     Appearance: Normal appearance. He is well-developed.   HENT:      Head: Normocephalic and atraumatic.   Eyes:      Conjunctiva/sclera: Conjunctivae normal.      Pupils: Pupils are equal, round, and reactive to light.   Neck:      Thyroid: No thyromegaly.      Vascular: No JVD.   Cardiovascular:      Rate and Rhythm: Normal rate and regular rhythm.      Heart sounds: Normal heart sounds. No murmur heard.  Pulmonary:      Breath sounds: Normal breath sounds. No wheezing or rales.   Musculoskeletal:         General: Normal range of motion.      Cervical back: Normal range of motion.      Comments: There is tenderness to palpation of midline lumbar spine, pain is greatest in very tight " muscles and right paraspinous and sciatic areas.  Flip test and straight leg raise are negative, normal strength, DTRs are within normal limits, no abnormal sensation   Lymphadenopathy:      Cervical: No cervical adenopathy.   Skin:     General: Skin is warm and dry.      Findings: No rash.   Neurological:      Mental Status: He is alert and oriented to person, place, and time.   Psychiatric:         Mood and Affect: Mood normal.         Behavior: Behavior normal.      Comments: Some psychomotor agitation with bouncing of leg            No visits with results within 1 Day(s) from this visit.   Latest known visit with results is:   No results found for any previous visit.             No results found for: HGBA1C             Assessment and Plan    Diagnoses and all orders for this visit:    1. Acute right-sided low back pain, unspecified whether sciatica present (Primary)  -     metaxalone (Skelaxin) 800 MG tablet; Take 1 tablet by mouth 3 (Three) Times a Day As Needed for Muscle Spasms.  Dispense: 30 tablet; Refill: 1    2. Low back strain, initial encounter  -     metaxalone (Skelaxin) 800 MG tablet; Take 1 tablet by mouth 3 (Three) Times a Day As Needed for Muscle Spasms.  Dispense: 30 tablet; Refill: 1    3. Attention deficit hyperactivity disorder (ADHD), combined type  -     amphetamine-dextroamphetamine XR (ADDERALL XR) 20 MG 24 hr capsule; Take 1 capsule by mouth Every Morning  Dispense: 30 capsule; Refill: 0    4. Class 1 obesity due to excess calories without serious comorbidity with body mass index (BMI) of 32.0 to 32.9 in adult  Assessment & Plan:  Patient's (Body mass index is 30.6 kg/m².) indicates that they are obese (BMI >30) with health conditions that include none . Weight is unchanged. BMI is is above average; BMI management plan is completed. We discussed portion control, increasing exercise and stimulant med for ADHD.       5. Anxiety and depression    6. Panic anxiety syndrome    7. Social  anxiety disorder      Acute right low back strain, possible piriformis syndrome.  Patient will  meloxicam and stop other anti-inflammatories have given him multiple handouts on stretching.  He can also use topicals such as lidocaine or diclofenac.  If symptoms persist would consider getting x-rays to evaluate for possibility of disc Disease.     History of reflux, he will restart Pepcid, at least while he is needing oral anti-inflammatories.     Patient has suffered with significant anxiety/social anxiety for many years.  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  He has had several years of counseling and does have multiple techniques with meditation, deep breathing, and even tapping that are beneficial but still has persistent symptoms.  He has not tolerated antidepressants very well and is currently off of all treatment except for occasional Klonopin.      Adult ADHD screen was completed and reviewed and is consistent with ADHD mixed hyperactive and inattentive type.  Full PH Q was completed, reviewed, inconsistent with anxiety/panic disorder. Scanned to chart for review. Testing is positive for ADHD and anxiety.  I am starting him on a Adderall 20 mg daily and will have him return in 4 weeks for reevaluation.    PHQ-9 Depression Screening  Little interest or pleasure in doing things? 3-->nearly every day   Feeling down, depressed, or hopeless? 2-->more than half the days   Trouble falling or staying asleep, or sleeping too much? 3-->nearly every day   Feeling tired or having little energy? 2-->more than half the days   Poor appetite or overeating? 1-->several days   Feeling bad about yourself - or that you are a failure or have let yourself or your family down? 0-->not at all   Trouble concentrating on things, such as reading the newspaper or watching television? 3-->nearly  every day   Moving or speaking so slowly that other people could have noticed? Or the opposite - being so fidgety or restless that you have been moving around a lot more than usual? 2-->more than half the days   Thoughts that you would be better off dead, or of hurting yourself in some way? 0-->not at all   PHQ-9 Total Score 16   If you checked off any problems, how difficult have these problems made it for you to do your work, take care of things at home, or get along with other people? very difficult         Medications Discontinued During This Encounter   Medication Reason   • metaxalone (Skelaxin) 800 MG tablet Reorder       I spent 45  minutes caring for Micah on this date of service. This time includes time spent by me in the following activities:reviewing tests, obtaining and/or reviewing a separately obtained history, performing a medically appropriate examination and/or evaluation , counseling and educating the patient/family/caregiver, ordering medications, tests, or procedures, documenting information in the medical record, independently interpreting results and communicating that information with the patient/family/caregiver and reviewing screening questionaires  Follow Up     Return in about 4 weeks (around 6/17/2022) for Recheck adhd.    Patient was given instructions and counseling regarding his condition or for health maintenance advice. Please see specific information pulled into the AVS if appropriate.

## 2022-06-08 ENCOUNTER — PATIENT MESSAGE (OUTPATIENT)
Dept: FAMILY MEDICINE CLINIC | Facility: CLINIC | Age: 32
End: 2022-06-08

## 2022-06-08 ENCOUNTER — TELEPHONE (OUTPATIENT)
Dept: FAMILY MEDICINE CLINIC | Facility: CLINIC | Age: 32
End: 2022-06-08

## 2022-06-08 DIAGNOSIS — F90.2 ATTENTION DEFICIT HYPERACTIVITY DISORDER (ADHD), COMBINED TYPE: ICD-10-CM

## 2022-06-08 RX ORDER — DEXTROAMPHETAMINE SACCHARATE, AMPHETAMINE ASPARTATE MONOHYDRATE, DEXTROAMPHETAMINE SULFATE AND AMPHETAMINE SULFATE 6.25; 6.25; 6.25; 6.25 MG/1; MG/1; MG/1; MG/1
25 CAPSULE, EXTENDED RELEASE ORAL EVERY MORNING
Qty: 30 CAPSULE | Refills: 0 | Status: SHIPPED | OUTPATIENT
Start: 2022-06-08 | End: 2022-06-22 | Stop reason: DRUGHIGH

## 2022-06-08 NOTE — TELEPHONE ENCOUNTER
----- Message from Micah Nettles sent at 6/8/2022  7:09 AM EDT -----  Regarding: My new prescription   The new prescription Adderall we decided to try was working fantastically until recently it seems that it has lost its effectiveness and isn't working as well. Is it possible for us to increase the dosage?

## 2022-06-08 NOTE — TELEPHONE ENCOUNTER
Yes, assuming insurance will allow new prescription befor 30 days, I am sending in Adderall 25 mg to take 1 daily.  He should keep appointment in approximately 2 weeks to reevaluate and determine if additional dose adjustment should be made.

## 2022-06-21 NOTE — PROGRESS NOTES
Chief Complaint  ADHD     History of Present Illness  Micah Wes Nettles presents today for follow up on ADHD. Patient is currently taking adderall 25mg. Patient reports the medication is currently not working as well for him, wants to discuss dosage changes.  States initially was very effective but is wearing off around 130 he has noticed his heart rate is faster at times.  He believes his blood pressure today is up because of a very stressful day at work and a hard time getting off work to make appointment.  Depression anxiety is well controlled, he only has about 5 Klonopin remaining, not taking often but likes to keep on hand in case he has a panic attack and is requesting refill.      Current Outpatient Medications on File Prior to Visit   Medication Sig   • halobetasol (Ultravate) 0.05 % cream Apply  topically to the appropriate area as directed 2 (Two) Times a Day.   • ondansetron (Zofran) 8 MG tablet 1/2 to 1 tablet every 8 hours if needed   • [DISCONTINUED] amphetamine-dextroamphetamine XR (Adderall XR) 25 MG 24 hr capsule Take 1 capsule by mouth Every Morning   • famotidine (Pepcid) 40 MG tablet Take 1 tablet by mouth Daily.   • hydrOXYzine pamoate (Vistaril) 25 MG capsule Take 1 capsule by mouth 3 (Three) Times a Day As Needed for Itching, Allergies or Anxiety.   • meloxicam (MOBIC) 15 MG tablet Take 1 tablet by mouth Daily.   • metaxalone (Skelaxin) 800 MG tablet Take 1 tablet by mouth 3 (Three) Times a Day As Needed for Muscle Spasms.   • omeprazole (priLOSEC) 40 MG capsule Take 1 capsule by mouth once daily   • sucralfate (Carafate) 1 g tablet Take 1 tablet by mouth 4 (Four) Times a Day.   • [DISCONTINUED] buPROPion XL (FORFIVO XL) 450 MG 24 hr tablet Take 1 tablet by mouth once daily   • [DISCONTINUED] clonazePAM (KlonoPIN) 1 MG tablet Take 1 tablet by mouth 3 (Three) Times a Day As Needed for Anxiety. for anxiety   • [DISCONTINUED] desvenlafaxine (PRISTIQ) 50 MG 24 hr tablet Take 1 tablet by mouth  "once daily   • [DISCONTINUED] EPINEPHrine (EPIPEN 2-BOO IJ) Inject 0.3 mg as directed Daily As Needed. Use as directed IM for bee stings     No current facility-administered medications on file prior to visit.       Objective   Vital Signs:   /82   Pulse 85   Temp 98.4 °F (36.9 °C)   Resp 20   Ht 182.9 cm (72.01\")   Wt 98 kg (216 lb)   SpO2 99%   BMI 29.29 kg/m²       Physical Exam  Vitals and nursing note reviewed.   Constitutional:       General: He is not in acute distress.     Appearance: Normal appearance. He is well-developed.   HENT:      Head: Normocephalic and atraumatic.   Eyes:      Conjunctiva/sclera: Conjunctivae normal.      Pupils: Pupils are equal, round, and reactive to light.   Neck:      Thyroid: No thyromegaly.      Vascular: No JVD.   Cardiovascular:      Rate and Rhythm: Normal rate and regular rhythm.      Heart sounds: Normal heart sounds. No murmur heard.  Pulmonary:      Breath sounds: Normal breath sounds. No wheezing or rales.   Musculoskeletal:         General: Normal range of motion.      Cervical back: Normal range of motion.   Lymphadenopathy:      Cervical: No cervical adenopathy.   Skin:     General: Skin is warm and dry.      Findings: No rash.   Neurological:      General: No focal deficit present.      Mental Status: He is alert and oriented to person, place, and time.   Psychiatric:         Mood and Affect: Mood normal.         Behavior: Behavior normal.         Thought Content: Thought content normal.         Judgment: Judgment normal.            No visits with results within 1 Day(s) from this visit.   Latest known visit with results is:   No results found for any previous visit.             No results found for: HGBA1C             Assessment and Plan    Diagnoses and all orders for this visit:    1. Class 1 obesity due to excess calories without serious comorbidity with body mass index (BMI) of 30.0 to 30.9 in adult (Primary)    2. Tobacco use    3. Attention " deficit hyperactivity disorder (ADHD), combined type  -     amphetamine-dextroamphetamine XR (Adderall XR) 20 MG 24 hr capsule; Take 2 capsules by mouth Every Morning  Dispense: 60 capsule; Refill: 0    4. Bee sting allergy  -     EPINEPHrine (EpiPen 2-Boo) 0.3 MG/0.3ML solution auto-injector injection; Inject 0.3 mL into the appropriate muscle as directed by prescriber 1 (One) Time for 1 dose. Use as directed IM for bee stings  Dispense: 1 each; Refill: 1    5. Anxiety and depression  -     clonazePAM (KlonoPIN) 1 MG tablet; Take 1 tablet by mouth 3 (Three) Times a Day As Needed for Anxiety. for anxiety  Dispense: 30 tablet; Refill: 1    6. PTSD (post-traumatic stress disorder)  -     clonazePAM (KlonoPIN) 1 MG tablet; Take 1 tablet by mouth 3 (Three) Times a Day As Needed for Anxiety. for anxiety  Dispense: 30 tablet; Refill: 1        ADHD responding to Adderall but dose needs to be increased.  We will increase to 40 mg total daily dose advised at least initially would like him to split doses to ensure stimulant effect is not too strong.  History of bee sting allergy states there are large units at work and is requesting epinephrine pen, previous pens have .  Depression anxiety stable renewing Klonopin.    Medications Discontinued During This Encounter   Medication Reason   • amphetamine-dextroamphetamine XR (Adderall XR) 25 MG 24 hr capsule Dose adjustment   • EPINEPHrine (EPIPEN 2-BOO IJ) Reorder   • clonazePAM (KlonoPIN) 1 MG tablet Reorder   • buPROPion XL (FORFIVO XL) 450 MG 24 hr tablet Alternate therapy   • desvenlafaxine (PRISTIQ) 50 MG 24 hr tablet Alternate therapy         Follow Up     Return in about 4 weeks (around 2022) for Recheck ADD.    Patient was given instructions and counseling regarding his condition or for health maintenance advice. Please see specific information pulled into the AVS if appropriate.

## 2022-06-22 ENCOUNTER — OFFICE VISIT (OUTPATIENT)
Dept: FAMILY MEDICINE CLINIC | Facility: CLINIC | Age: 32
End: 2022-06-22

## 2022-06-22 VITALS
BODY MASS INDEX: 29.26 KG/M2 | TEMPERATURE: 98.4 F | RESPIRATION RATE: 20 BRPM | DIASTOLIC BLOOD PRESSURE: 82 MMHG | WEIGHT: 216 LBS | OXYGEN SATURATION: 99 % | HEIGHT: 72 IN | SYSTOLIC BLOOD PRESSURE: 146 MMHG | HEART RATE: 85 BPM

## 2022-06-22 DIAGNOSIS — E66.09 CLASS 1 OBESITY DUE TO EXCESS CALORIES WITHOUT SERIOUS COMORBIDITY WITH BODY MASS INDEX (BMI) OF 30.0 TO 30.9 IN ADULT: Primary | ICD-10-CM

## 2022-06-22 DIAGNOSIS — F90.2 ATTENTION DEFICIT HYPERACTIVITY DISORDER (ADHD), COMBINED TYPE: ICD-10-CM

## 2022-06-22 DIAGNOSIS — Z91.030 BEE STING ALLERGY: ICD-10-CM

## 2022-06-22 DIAGNOSIS — Z72.0 TOBACCO USE: ICD-10-CM

## 2022-06-22 DIAGNOSIS — F32.A ANXIETY AND DEPRESSION: ICD-10-CM

## 2022-06-22 DIAGNOSIS — F43.10 PTSD (POST-TRAUMATIC STRESS DISORDER): ICD-10-CM

## 2022-06-22 DIAGNOSIS — F41.9 ANXIETY AND DEPRESSION: ICD-10-CM

## 2022-06-22 PROCEDURE — 99214 OFFICE O/P EST MOD 30 MIN: CPT | Performed by: FAMILY MEDICINE

## 2022-06-22 RX ORDER — DEXTROAMPHETAMINE SACCHARATE, AMPHETAMINE ASPARTATE MONOHYDRATE, DEXTROAMPHETAMINE SULFATE AND AMPHETAMINE SULFATE 5; 5; 5; 5 MG/1; MG/1; MG/1; MG/1
40 CAPSULE, EXTENDED RELEASE ORAL EVERY MORNING
Qty: 60 CAPSULE | Refills: 0 | Status: SHIPPED | OUTPATIENT
Start: 2022-06-22 | End: 2022-07-27 | Stop reason: DRUGHIGH

## 2022-06-22 RX ORDER — CLONAZEPAM 1 MG/1
1 TABLET ORAL 3 TIMES DAILY PRN
Qty: 30 TABLET | Refills: 1 | Status: SHIPPED | OUTPATIENT
Start: 2022-06-22 | End: 2022-12-28 | Stop reason: SDUPTHER

## 2022-06-22 RX ORDER — EPINEPHRINE 0.3 MG/.3ML
0.3 INJECTION SUBCUTANEOUS ONCE
Qty: 1 EACH | Refills: 1 | Status: SHIPPED | OUTPATIENT
Start: 2022-06-22 | End: 2022-06-22

## 2022-07-27 ENCOUNTER — OFFICE VISIT (OUTPATIENT)
Dept: FAMILY MEDICINE CLINIC | Facility: CLINIC | Age: 32
End: 2022-07-27

## 2022-07-27 VITALS
HEIGHT: 72 IN | BODY MASS INDEX: 28.96 KG/M2 | SYSTOLIC BLOOD PRESSURE: 126 MMHG | RESPIRATION RATE: 18 BRPM | OXYGEN SATURATION: 98 % | DIASTOLIC BLOOD PRESSURE: 82 MMHG | TEMPERATURE: 98.4 F | WEIGHT: 213.8 LBS | HEART RATE: 105 BPM

## 2022-07-27 DIAGNOSIS — E66.3 OVERWEIGHT (BMI 25.0-29.9): ICD-10-CM

## 2022-07-27 DIAGNOSIS — F32.A ANXIETY AND DEPRESSION: ICD-10-CM

## 2022-07-27 DIAGNOSIS — Z72.0 TOBACCO USE: ICD-10-CM

## 2022-07-27 DIAGNOSIS — F41.9 ANXIETY AND DEPRESSION: ICD-10-CM

## 2022-07-27 DIAGNOSIS — F90.2 ATTENTION DEFICIT HYPERACTIVITY DISORDER (ADHD), COMBINED TYPE: Primary | ICD-10-CM

## 2022-07-27 PROCEDURE — 99213 OFFICE O/P EST LOW 20 MIN: CPT | Performed by: FAMILY MEDICINE

## 2022-07-27 RX ORDER — DEXTROAMPHETAMINE SACCHARATE, AMPHETAMINE ASPARTATE MONOHYDRATE, DEXTROAMPHETAMINE SULFATE AND AMPHETAMINE SULFATE 7.5; 7.5; 7.5; 7.5 MG/1; MG/1; MG/1; MG/1
CAPSULE, EXTENDED RELEASE ORAL
Qty: 60 CAPSULE | Refills: 0 | Status: SHIPPED | OUTPATIENT
Start: 2022-07-27 | End: 2022-08-26 | Stop reason: SDUPTHER

## 2022-07-27 NOTE — PROGRESS NOTES
"Chief Complaint  ADHD     History of Present Illness  Micah Nettles presents today for follow up on ADHD and Anxiety. Patient is taking Adderall and Klonopin. Patient's adderall was increased to 40 mg. Adderal does help concentration but is not keeping his ADHD controlled long enough. Working nights. Go to bed at 4 am.  Appetite has returned to normal, no other side effects sleeping ok.       Current Outpatient Medications on File Prior to Visit   Medication Sig   • clonazePAM (KlonoPIN) 1 MG tablet Take 1 tablet by mouth 3 (Three) Times a Day As Needed for Anxiety. for anxiety   • famotidine (Pepcid) 40 MG tablet Take 1 tablet by mouth Daily.   • halobetasol (Ultravate) 0.05 % cream Apply  topically to the appropriate area as directed 2 (Two) Times a Day.   • meloxicam (MOBIC) 15 MG tablet Take 1 tablet by mouth Daily.   • ondansetron (Zofran) 8 MG tablet 1/2 to 1 tablet every 8 hours if needed   • [DISCONTINUED] amphetamine-dextroamphetamine XR (Adderall XR) 20 MG 24 hr capsule Take 2 capsules by mouth Every Morning   • hydrOXYzine pamoate (Vistaril) 25 MG capsule Take 1 capsule by mouth 3 (Three) Times a Day As Needed for Itching, Allergies or Anxiety.   • metaxalone (Skelaxin) 800 MG tablet Take 1 tablet by mouth 3 (Three) Times a Day As Needed for Muscle Spasms.   • omeprazole (priLOSEC) 40 MG capsule Take 1 capsule by mouth once daily   • sucralfate (Carafate) 1 g tablet Take 1 tablet by mouth 4 (Four) Times a Day.     No current facility-administered medications on file prior to visit.       Objective   Vital Signs:   /82 (BP Location: Right arm, Patient Position: Sitting, Cuff Size: Adult)   Pulse 105   Temp 98.4 °F (36.9 °C) (Temporal)   Resp 18   Ht 182.9 cm (72.01\")   Wt 97 kg (213 lb 12.8 oz)   SpO2 98% Comment: room air  BMI 28.99 kg/m²       Physical Exam  Vitals and nursing note reviewed.   Constitutional:       General: He is not in acute distress.     Appearance: He is " well-developed.   Eyes:      Conjunctiva/sclera: Conjunctivae normal.      Pupils: Pupils are equal, round, and reactive to light.   Cardiovascular:      Rate and Rhythm: Normal rate and regular rhythm.      Heart sounds: No murmur heard.  Pulmonary:      Effort: Pulmonary effort is normal.      Breath sounds: No wheezing.   Musculoskeletal:         General: Normal range of motion.      Cervical back: Normal range of motion.   Skin:     General: Skin is warm and dry.   Neurological:      Mental Status: He is alert and oriented to person, place, and time.            No visits with results within 1 Day(s) from this visit.   Latest known visit with results is:   No results found for any previous visit.             No results found for: HGBA1C             Assessment and Plan    Diagnoses and all orders for this visit:    1. Attention deficit hyperactivity disorder (ADHD), combined type (Primary)  -     amphetamine-dextroamphetamine XR (Adderall XR) 30 MG 24 hr capsule; 1 at 3 pm and 1 around 7 pm  Dispense: 60 capsule; Refill: 0    2. Anxiety and depression    3. Tobacco use    4. Overweight (BMI 25.0-29.9)      ADHD improved with Adderall but medication wearing off well before the end of the workday.  Plan to increase to 30 mg twice daily.  Anxiety and depression stable continue Vistaril or Klonopin on an as-needed basis.      Medications Discontinued During This Encounter   Medication Reason   • amphetamine-dextroamphetamine XR (Adderall XR) 20 MG 24 hr capsule Dose adjustment         Follow Up     Return in about 4 weeks (around 8/24/2022).    Patient was given instructions and counseling regarding his condition or for health maintenance advice. Please see specific information pulled into the AVS if appropriate.

## 2022-07-28 DIAGNOSIS — F90.2 ATTENTION DEFICIT HYPERACTIVITY DISORDER (ADHD), COMBINED TYPE: ICD-10-CM

## 2022-07-29 ENCOUNTER — TELEPHONE (OUTPATIENT)
Dept: FAMILY MEDICINE CLINIC | Facility: CLINIC | Age: 32
End: 2022-07-29

## 2022-08-02 RX ORDER — DEXTROAMPHETAMINE SACCHARATE, AMPHETAMINE ASPARTATE MONOHYDRATE, DEXTROAMPHETAMINE SULFATE AND AMPHETAMINE SULFATE 7.5; 7.5; 7.5; 7.5 MG/1; MG/1; MG/1; MG/1
CAPSULE, EXTENDED RELEASE ORAL
Qty: 60 CAPSULE | Refills: 0 | OUTPATIENT
Start: 2022-08-02

## 2022-08-02 NOTE — TELEPHONE ENCOUNTER
Documented in patient's chart on 7/29 that PA was approved for his adderall medication. PA was done for the prescription Adderall XR 30 MG QTY 60.

## 2022-08-26 DIAGNOSIS — R11.0 NAUSEA: ICD-10-CM

## 2022-08-26 DIAGNOSIS — F90.2 ATTENTION DEFICIT HYPERACTIVITY DISORDER (ADHD), COMBINED TYPE: ICD-10-CM

## 2022-08-27 RX ORDER — ONDANSETRON HYDROCHLORIDE 8 MG/1
TABLET, FILM COATED ORAL
Qty: 18 TABLET | Refills: 5 | Status: SHIPPED | OUTPATIENT
Start: 2022-08-27

## 2022-08-27 RX ORDER — DEXTROAMPHETAMINE SACCHARATE, AMPHETAMINE ASPARTATE MONOHYDRATE, DEXTROAMPHETAMINE SULFATE AND AMPHETAMINE SULFATE 7.5; 7.5; 7.5; 7.5 MG/1; MG/1; MG/1; MG/1
CAPSULE, EXTENDED RELEASE ORAL
Qty: 60 CAPSULE | Refills: 0 | Status: SHIPPED | OUTPATIENT
Start: 2022-08-27 | End: 2022-09-20 | Stop reason: SDUPTHER

## 2022-09-19 NOTE — PROGRESS NOTES
"Chief Complaint  ADHD and Cyst     History of Present Illness    Micah Nettles presents today for ADHD follow up and cyst under right buttock.   Current treatment is Adderall.  Patient states Adderall is working well.     Working 2 pm to 2 am 5-6 days a week.     Cyst present x 1-2 weeks.  Patient states cyst is about size of quarter.     Occurred previously 1 month ago for a few days.    He states it is sore when he sits on the toilet but there is no actual pain with defecation.  He has not had fevers or chills, nausea or vomiting.    Current Outpatient Medications on File Prior to Visit   Medication Sig   • clonazePAM (KlonoPIN) 1 MG tablet Take 1 tablet by mouth 3 (Three) Times a Day As Needed for Anxiety. for anxiety   • halobetasol (Ultravate) 0.05 % cream Apply  topically to the appropriate area as directed 2 (Two) Times a Day.   • meloxicam (MOBIC) 15 MG tablet Take 1 tablet by mouth Daily.   • metaxalone (Skelaxin) 800 MG tablet Take 1 tablet by mouth 3 (Three) Times a Day As Needed for Muscle Spasms.   • ondansetron (Zofran) 8 MG tablet 1/2 to 1 tablet every 8 hours if needed   • [DISCONTINUED] amphetamine-dextroamphetamine XR (Adderall XR) 30 MG 24 hr capsule 1 at 3 pm and 1 around 7 pm   • [DISCONTINUED] famotidine (Pepcid) 40 MG tablet Take 1 tablet by mouth Daily.   • [DISCONTINUED] hydrOXYzine pamoate (Vistaril) 25 MG capsule Take 1 capsule by mouth 3 (Three) Times a Day As Needed for Itching, Allergies or Anxiety.   • [DISCONTINUED] omeprazole (priLOSEC) 40 MG capsule Take 1 capsule by mouth once daily   • [DISCONTINUED] sucralfate (Carafate) 1 g tablet Take 1 tablet by mouth 4 (Four) Times a Day.     No current facility-administered medications on file prior to visit.       Objective   Vital Signs:   /80 (BP Location: Right arm, Patient Position: Sitting, Cuff Size: Adult)   Pulse 110   Temp 98 °F (36.7 °C) (Temporal)   Resp 18   Ht 182.9 cm (72\")   Wt 96.4 kg (212 lb 8 oz)   SpO2 " 99% Comment: room air  BMI 28.82 kg/m²       Physical Exam  Vitals and nursing note reviewed.   Constitutional:       General: He is not in acute distress.     Appearance: He is well-developed.   HENT:      Head: Normocephalic and atraumatic.   Eyes:      Pupils: Pupils are equal, round, and reactive to light.   Cardiovascular:      Rate and Rhythm: Regular rhythm.      Heart sounds: No murmur heard.  Pulmonary:      Breath sounds: Normal breath sounds. No wheezing or rales.   Skin:     General: Skin is warm and dry.      Findings: No rash.      Comments: Right gluteal cleft just about 1 1/2 cm from anus is a 1-1/2 cm firm, fluctuant, swollen, tender abscess without active drainage.  There is a slight purplish discoloration   Neurological:      Mental Status: He is alert and oriented to person, place, and time.   Psychiatric:         Mood and Affect: Mood normal.         Thought Content: Thought content normal.         Judgment: Judgment normal.      Comments: Anxious            No visits with results within 1 Day(s) from this visit.   Latest known visit with results is:   No results found for any previous visit.             No results found for: HGBA1C             Assessment and Plan    Diagnoses and all orders for this visit:    1. Attention deficit hyperactivity disorder (ADHD), combined type (Primary)  -     amphetamine-dextroamphetamine XR (Adderall XR) 30 MG 24 hr capsule; 1 at 3 pm and 1 around 7 pm  Dispense: 60 capsule; Refill: 0    2. Perirectal abscess  -     metroNIDAZOLE (FLAGYL) 500 MG tablet; Take 1 tablet by mouth 3 (Three) Times a Day. Do not use alcohol for at least 24 hours after the last dose.  Dispense: 21 tablet; Refill: 0  -     sulfamethoxazole-trimethoprim (Bactrim DS) 800-160 MG per tablet; Take 1 tablet by mouth 2 (Two) Times a Day.  Dispense: 14 tablet; Refill: 0    3. Tobacco use    4. Overweight (BMI 25.0-29.9)  Assessment & Plan:  Patient's (Body mass index is 28.82 kg/m².) indicates  that they are overweight with health conditions that include GERD . Weight is unchanged. BMI is is above average; BMI management plan is completed. We discussed portion control and increasing exercise.       5. Anxiety and depression    6. Alcohol use  ADHD and anxiety stable with current medications renewing Adderall.    Perirectal abscess, offered referral to surgeon for treatment, patient declines, starting on Flagyl and Bactrim, advised to not drink alcohol while taking Flagyl.  States recently he has been drinking daily due to stress at work.  His Klonopin would be a safer option while he is on Flagyl and we need to work on reducing and stopping daily alcohol use.  We will have him return tomorrow for I&D may be able to stop antibiotics following procedure..      Medications Discontinued During This Encounter   Medication Reason   • famotidine (Pepcid) 40 MG tablet *Therapy completed   • hydrOXYzine pamoate (Vistaril) 25 MG capsule *Therapy completed   • omeprazole (priLOSEC) 40 MG capsule *Therapy completed   • sucralfate (Carafate) 1 g tablet *Therapy completed   • amphetamine-dextroamphetamine XR (Adderall XR) 30 MG 24 hr capsule Reorder         Follow Up     No follow-ups on file.    Patient was given instructions and counseling regarding his condition or for health maintenance advice. Please see specific information pulled into the AVS if appropriate.

## 2022-09-20 ENCOUNTER — OFFICE VISIT (OUTPATIENT)
Dept: FAMILY MEDICINE CLINIC | Facility: CLINIC | Age: 32
End: 2022-09-20

## 2022-09-20 VITALS
WEIGHT: 212.5 LBS | BODY MASS INDEX: 28.78 KG/M2 | HEART RATE: 110 BPM | DIASTOLIC BLOOD PRESSURE: 80 MMHG | RESPIRATION RATE: 18 BRPM | OXYGEN SATURATION: 99 % | TEMPERATURE: 98 F | SYSTOLIC BLOOD PRESSURE: 134 MMHG | HEIGHT: 72 IN

## 2022-09-20 DIAGNOSIS — F90.2 ATTENTION DEFICIT HYPERACTIVITY DISORDER (ADHD), COMBINED TYPE: Primary | ICD-10-CM

## 2022-09-20 DIAGNOSIS — F32.A ANXIETY AND DEPRESSION: ICD-10-CM

## 2022-09-20 DIAGNOSIS — E66.3 OVERWEIGHT (BMI 25.0-29.9): ICD-10-CM

## 2022-09-20 DIAGNOSIS — Z72.0 TOBACCO USE: ICD-10-CM

## 2022-09-20 DIAGNOSIS — F41.9 ANXIETY AND DEPRESSION: ICD-10-CM

## 2022-09-20 DIAGNOSIS — Z78.9 ALCOHOL USE: ICD-10-CM

## 2022-09-20 DIAGNOSIS — K61.1 PERIRECTAL ABSCESS: ICD-10-CM

## 2022-09-20 PROBLEM — F10.90 ALCOHOL USE: Status: ACTIVE | Noted: 2022-09-20

## 2022-09-20 PROCEDURE — 99214 OFFICE O/P EST MOD 30 MIN: CPT | Performed by: FAMILY MEDICINE

## 2022-09-20 RX ORDER — METRONIDAZOLE 500 MG/1
500 TABLET ORAL 3 TIMES DAILY
Qty: 21 TABLET | Refills: 0 | Status: SHIPPED | OUTPATIENT
Start: 2022-09-20 | End: 2022-12-28

## 2022-09-20 RX ORDER — SULFAMETHOXAZOLE AND TRIMETHOPRIM 800; 160 MG/1; MG/1
1 TABLET ORAL 2 TIMES DAILY
Qty: 14 TABLET | Refills: 0 | Status: SHIPPED | OUTPATIENT
Start: 2022-09-20 | End: 2022-12-28

## 2022-09-20 RX ORDER — DEXTROAMPHETAMINE SACCHARATE, AMPHETAMINE ASPARTATE MONOHYDRATE, DEXTROAMPHETAMINE SULFATE AND AMPHETAMINE SULFATE 7.5; 7.5; 7.5; 7.5 MG/1; MG/1; MG/1; MG/1
CAPSULE, EXTENDED RELEASE ORAL
Qty: 60 CAPSULE | Refills: 0 | Status: SHIPPED | OUTPATIENT
Start: 2022-09-20 | End: 2022-10-26 | Stop reason: SDUPTHER

## 2022-09-20 NOTE — ASSESSMENT & PLAN NOTE
Patient's (Body mass index is 28.82 kg/m².) indicates that they are overweight with health conditions that include GERD . Weight is unchanged. BMI is is above average; BMI management plan is completed. We discussed portion control and increasing exercise.

## 2022-09-21 ENCOUNTER — PROCEDURE VISIT (OUTPATIENT)
Dept: FAMILY MEDICINE CLINIC | Facility: CLINIC | Age: 32
End: 2022-09-21

## 2022-09-21 DIAGNOSIS — E66.09 CLASS 1 OBESITY DUE TO EXCESS CALORIES WITHOUT SERIOUS COMORBIDITY WITH BODY MASS INDEX (BMI) OF 32.0 TO 32.9 IN ADULT: ICD-10-CM

## 2022-09-21 DIAGNOSIS — Z72.0 TOBACCO USE: ICD-10-CM

## 2022-09-21 DIAGNOSIS — K61.1 PERIRECTAL ABSCESS: Primary | ICD-10-CM

## 2022-09-21 DIAGNOSIS — R55 VASOVAGAL REACTION: ICD-10-CM

## 2022-09-21 PROCEDURE — 99212 OFFICE O/P EST SF 10 MIN: CPT | Performed by: FAMILY MEDICINE

## 2022-09-21 PROCEDURE — 10060 I&D ABSCESS SIMPLE/SINGLE: CPT | Performed by: FAMILY MEDICINE

## 2022-10-17 PROBLEM — R55 VASOVAGAL REACTION: Status: ACTIVE | Noted: 2022-10-17

## 2022-10-26 DIAGNOSIS — F90.2 ATTENTION DEFICIT HYPERACTIVITY DISORDER (ADHD), COMBINED TYPE: ICD-10-CM

## 2022-10-26 RX ORDER — DEXTROAMPHETAMINE SACCHARATE, AMPHETAMINE ASPARTATE MONOHYDRATE, DEXTROAMPHETAMINE SULFATE AND AMPHETAMINE SULFATE 7.5; 7.5; 7.5; 7.5 MG/1; MG/1; MG/1; MG/1
CAPSULE, EXTENDED RELEASE ORAL
Qty: 60 CAPSULE | Refills: 0 | Status: SHIPPED | OUTPATIENT
Start: 2022-10-26 | End: 2022-11-26 | Stop reason: SDUPTHER

## 2022-11-26 DIAGNOSIS — F90.2 ATTENTION DEFICIT HYPERACTIVITY DISORDER (ADHD), COMBINED TYPE: ICD-10-CM

## 2022-11-28 RX ORDER — DEXTROAMPHETAMINE SACCHARATE, AMPHETAMINE ASPARTATE MONOHYDRATE, DEXTROAMPHETAMINE SULFATE AND AMPHETAMINE SULFATE 7.5; 7.5; 7.5; 7.5 MG/1; MG/1; MG/1; MG/1
CAPSULE, EXTENDED RELEASE ORAL
Qty: 60 CAPSULE | Refills: 0 | Status: SHIPPED | OUTPATIENT
Start: 2022-11-28 | End: 2022-12-28 | Stop reason: SDUPTHER

## 2022-11-28 NOTE — TELEPHONE ENCOUNTER
Renewing Adderall as requested.  He will be due for appointment prior to next refill.  Please have him schedule an appointment in December.  Thank you

## 2022-12-28 ENCOUNTER — OFFICE VISIT (OUTPATIENT)
Dept: FAMILY MEDICINE CLINIC | Facility: CLINIC | Age: 32
End: 2022-12-28
Payer: COMMERCIAL

## 2022-12-28 VITALS
RESPIRATION RATE: 18 BRPM | OXYGEN SATURATION: 98 % | WEIGHT: 208 LBS | HEART RATE: 98 BPM | DIASTOLIC BLOOD PRESSURE: 82 MMHG | TEMPERATURE: 97.7 F | BODY MASS INDEX: 28.17 KG/M2 | HEIGHT: 72 IN | SYSTOLIC BLOOD PRESSURE: 140 MMHG

## 2022-12-28 DIAGNOSIS — F32.A ANXIETY AND DEPRESSION: ICD-10-CM

## 2022-12-28 DIAGNOSIS — K61.1 PERIRECTAL ABSCESS: ICD-10-CM

## 2022-12-28 DIAGNOSIS — E66.09 CLASS 1 OBESITY DUE TO EXCESS CALORIES WITHOUT SERIOUS COMORBIDITY WITH BODY MASS INDEX (BMI) OF 32.0 TO 32.9 IN ADULT: ICD-10-CM

## 2022-12-28 DIAGNOSIS — E66.3 OVERWEIGHT (BMI 25.0-29.9): ICD-10-CM

## 2022-12-28 DIAGNOSIS — Z72.0 TOBACCO USE: ICD-10-CM

## 2022-12-28 DIAGNOSIS — F41.9 ANXIETY AND DEPRESSION: ICD-10-CM

## 2022-12-28 DIAGNOSIS — Z78.9 ALCOHOL USE: ICD-10-CM

## 2022-12-28 DIAGNOSIS — M54.6 MIDLINE THORACIC BACK PAIN, UNSPECIFIED CHRONICITY: ICD-10-CM

## 2022-12-28 DIAGNOSIS — F43.10 PTSD (POST-TRAUMATIC STRESS DISORDER): ICD-10-CM

## 2022-12-28 DIAGNOSIS — F90.2 ATTENTION DEFICIT HYPERACTIVITY DISORDER (ADHD), COMBINED TYPE: Primary | ICD-10-CM

## 2022-12-28 PROBLEM — E66.811 CLASS 1 OBESITY DUE TO EXCESS CALORIES WITHOUT SERIOUS COMORBIDITY WITH BODY MASS INDEX (BMI) OF 32.0 TO 32.9 IN ADULT: Status: RESOLVED | Noted: 2021-09-14 | Resolved: 2022-12-28

## 2022-12-28 PROCEDURE — 99214 OFFICE O/P EST MOD 30 MIN: CPT | Performed by: FAMILY MEDICINE

## 2022-12-28 RX ORDER — CLONAZEPAM 1 MG/1
1 TABLET ORAL 2 TIMES DAILY PRN
Qty: 30 TABLET | Refills: 1 | Status: SHIPPED | OUTPATIENT
Start: 2022-12-28 | End: 2023-04-03 | Stop reason: SDUPTHER

## 2022-12-28 RX ORDER — DEXTROAMPHETAMINE SACCHARATE, AMPHETAMINE ASPARTATE MONOHYDRATE, DEXTROAMPHETAMINE SULFATE AND AMPHETAMINE SULFATE 7.5; 7.5; 7.5; 7.5 MG/1; MG/1; MG/1; MG/1
CAPSULE, EXTENDED RELEASE ORAL
Qty: 60 CAPSULE | Refills: 0 | Status: SHIPPED | OUTPATIENT
Start: 2022-12-28 | End: 2022-12-30 | Stop reason: SDUPTHER

## 2022-12-28 RX ORDER — MELOXICAM 15 MG/1
TABLET ORAL
Qty: 30 TABLET | Refills: 2 | Status: SHIPPED | OUTPATIENT
Start: 2022-12-28

## 2022-12-28 RX ORDER — AMOXICILLIN AND CLAVULANATE POTASSIUM 875; 125 MG/1; MG/1
1 TABLET, FILM COATED ORAL 2 TIMES DAILY
Qty: 20 TABLET | Refills: 0 | Status: SHIPPED | OUTPATIENT
Start: 2022-12-28 | End: 2023-04-03

## 2022-12-28 NOTE — PROGRESS NOTES
Answers for HPI/ROS submitted by the patient on 12/21/2022  What is the primary reason for your visit?: Other  Please describe your symptoms.: Follow up for ADHD  Have you had these symptoms before?: Yes  How long have you been having these symptoms?: Greater than 2 weeks    Chief Complaint  ADHD    History of Present Illness  Micah Nettles presents today for follow up on adhd.    ADHD:  Symptoms include inattention, forgetfulness, careless mistakes, losing things, avoiding mental tasks and fidgeting. The symptoms occur at home, at work, during social events, in the morning , in the afternoon, in the evening and on weekends. Onset was Since 05/2022.. The symptoms are described as Moderate in severity. The symptoms are described as unchanged. Symptoms are exacerbated by work environment, distracting activities and conversation. Symptoms are relieved by stimulant medication. Associated symptoms include anxiety disorder and major depression. Current treatment includes dextroamphetamine/amphetamine (Adderall). By report, Micah is compliant most of the time.    Concern with back Pain.  Patient states since I&D of perirectal abscess it did fill up again to come painful and burst on its own.  Since that time it has not become painful but has continued to drain occasionally.    Current Outpatient Medications on File Prior to Visit   Medication Sig   • halobetasol (Ultravate) 0.05 % cream Apply  topically to the appropriate area as directed 2 (Two) Times a Day.   • ondansetron (Zofran) 8 MG tablet 1/2 to 1 tablet every 8 hours if needed     No current facility-administered medications on file prior to visit.       Objective   Vital Signs:   /82 (BP Location: Right arm, Patient Position: Sitting, Cuff Size: Adult)   Pulse 98   Temp 97.7 °F (36.5 °C) (Temporal)   Resp 18   Ht 182.9 cm (72\")   Wt 94.3 kg (208 lb)   SpO2 98%   BMI 28.21 kg/m²       Physical Exam  Vitals and nursing note reviewed.    Constitutional:       General: He is not in acute distress.     Appearance: Normal appearance. He is well-developed.   HENT:      Head: Normocephalic and atraumatic.   Eyes:      Conjunctiva/sclera: Conjunctivae normal.      Pupils: Pupils are equal, round, and reactive to light.   Neck:      Thyroid: No thyromegaly.      Vascular: No JVD.   Cardiovascular:      Rate and Rhythm: Normal rate and regular rhythm.      Heart sounds: Normal heart sounds. No murmur heard.  Pulmonary:      Breath sounds: Normal breath sounds. No wheezing or rales.   Musculoskeletal:         General: Normal range of motion.      Cervical back: Normal range of motion.   Lymphadenopathy:      Cervical: No cervical adenopathy.   Skin:     General: Skin is warm and dry.      Findings: No rash.   Neurological:      Mental Status: He is alert and oriented to person, place, and time.   Psychiatric:         Mood and Affect: Mood normal.         Behavior: Behavior normal.                         Assessment and Plan    Diagnoses and all orders for this visit:    1. Attention deficit hyperactivity disorder (ADHD), combined type (Primary)  -     Discontinue: amphetamine-dextroamphetamine XR (Adderall XR) 30 MG 24 hr capsule; 1 at 3 pm and 1 around 7 pm  Dispense: 60 capsule; Refill: 0    2. Class 1 obesity due to excess calories without serious comorbidity with body mass index (BMI) of 32.0 to 32.9 in adult    3. Overweight (BMI 25.0-29.9)  Assessment & Plan:  Patient's (Body mass index is 28.21 kg/m².) indicates that they are overweight with health conditions that include GERD . Weight is unchanged. BMI is is above average; BMI management plan is completed. We discussed portion control and increasing exercise.       4. Tobacco use    5. Alcohol use    6. Perirectal abscess  -     amoxicillin-clavulanate (AUGMENTIN) 875-125 MG per tablet; Take 1 tablet by mouth 2 (Two) Times a Day.  Dispense: 20 tablet; Refill: 0    7. Midline thoracic back pain,  unspecified chronicity  -     meloxicam (MOBIC) 15 MG tablet; 1/2 to 1 daily as needed for pain  Dispense: 30 tablet; Refill: 2    8. Anxiety and depression  -     clonazePAM (KlonoPIN) 1 MG tablet; Take 1 tablet by mouth 2 (Two) Times a Day As Needed for Anxiety. for anxiety  Dispense: 30 tablet; Refill: 1    9. PTSD (post-traumatic stress disorder)  -     clonazePAM (KlonoPIN) 1 MG tablet; Take 1 tablet by mouth 2 (Two) Times a Day As Needed for Anxiety. for anxiety  Dispense: 30 tablet; Refill: 1    ADHD, patient reports significantly improved concentration and ability to complete tasks at work since starting Adderall.  Continuing without change.    Patient reports recurrence/persistence of perirectal abscess.  He states it is no longer enlarged or tender at this time but reports it will continue to drain a little at times.  Recommended referral to surgery for further evaluation and treatment, patient declines at this time.  We will treat with a course of antibiotics and if symptoms persist he may have to see surgery.    Chronic midline back pain renewing meloxicam.    Anxiety and posttraumatic stress disorder, renewing Klonopin.    Excessive alcohol use, encouraged patient to reduce immediately to a maximum of 3 beverages daily and set a plan to quit completely.    Medications Discontinued During This Encounter   Medication Reason   • meloxicam (MOBIC) 15 MG tablet *Therapy completed   • metaxalone (Skelaxin) 800 MG tablet *Therapy completed   • metroNIDAZOLE (FLAGYL) 500 MG tablet *Therapy completed   • sulfamethoxazole-trimethoprim (Bactrim DS) 800-160 MG per tablet *Therapy completed   • clonazePAM (KlonoPIN) 1 MG tablet Reorder   • amphetamine-dextroamphetamine XR (Adderall XR) 30 MG 24 hr capsule Reorder         Follow Up     Return in about 3 months (around 3/28/2023) for adhd anxiety etc.    Patient was given instructions and counseling regarding his condition or for health maintenance advice. Please see  specific information pulled into the AVS if appropriate.

## 2022-12-28 NOTE — ASSESSMENT & PLAN NOTE
Patient's (Body mass index is 28.21 kg/m².) indicates that they are overweight with health conditions that include GERD . Weight is unchanged. BMI is is above average; BMI management plan is completed. We discussed portion control and increasing exercise.

## 2022-12-29 ENCOUNTER — PATIENT MESSAGE (OUTPATIENT)
Dept: FAMILY MEDICINE CLINIC | Facility: CLINIC | Age: 32
End: 2022-12-29
Payer: COMMERCIAL

## 2022-12-29 DIAGNOSIS — F90.2 ATTENTION DEFICIT HYPERACTIVITY DISORDER (ADHD), COMBINED TYPE: Primary | ICD-10-CM

## 2022-12-30 ENCOUNTER — TELEPHONE (OUTPATIENT)
Dept: FAMILY MEDICINE CLINIC | Facility: CLINIC | Age: 32
End: 2022-12-30

## 2022-12-30 DIAGNOSIS — F90.2 ATTENTION DEFICIT HYPERACTIVITY DISORDER (ADHD), COMBINED TYPE: ICD-10-CM

## 2022-12-30 RX ORDER — DEXTROAMPHETAMINE SACCHARATE, AMPHETAMINE ASPARTATE MONOHYDRATE, DEXTROAMPHETAMINE SULFATE AND AMPHETAMINE SULFATE 7.5; 7.5; 7.5; 7.5 MG/1; MG/1; MG/1; MG/1
CAPSULE, EXTENDED RELEASE ORAL
Qty: 60 CAPSULE | Refills: 0 | Status: SHIPPED | OUTPATIENT
Start: 2022-12-30 | End: 2023-01-10 | Stop reason: RX

## 2022-12-30 NOTE — TELEPHONE ENCOUNTER
Explained to patient that you are out of the office and will not return until 01/04/2022.  I was wondering if I could have my prescription amphetamine-dextroamphetamine XR 30 MG 24 hr capsule transferred to [facility] in [place] because [facility] pharmacy is always running out of it and I have to go without this is day 2

## 2023-01-09 NOTE — TELEPHONE ENCOUNTER
Shilpi,  Can you please check with [facility] and see if they still have the prescription for Adderall XR 30 mg that was sent in on December 28, and if and when they can get this filled.  If there is going to be continued delay ask patient if he would want to use immediate release Adderall until the supply chain gets worked out.  He wanted to try the immediate release, we could use 20 mg capsules 3 times or 30 mg twice daily.  Renée Bello MD

## 2023-01-09 NOTE — TELEPHONE ENCOUNTER
From: Micah Nettles  To: Renée Bello  Sent: 12/29/2022 3:41 PM EST  Subject: amphetamine-dextroamphetamine XR 30 MG 24 hr capsule    I was wondering if I could have my prescription amphetamine-dextroamphetamine XR 30 MG 24 hr capsule transferred to [facility] in [place] because [facility] pharmacy is always running out of it and I have to go without this is day 2

## 2023-01-10 ENCOUNTER — TELEPHONE (OUTPATIENT)
Dept: FAMILY MEDICINE CLINIC | Facility: CLINIC | Age: 33
End: 2023-01-10

## 2023-01-10 DIAGNOSIS — F90.2 ATTENTION DEFICIT HYPERACTIVITY DISORDER (ADHD), COMBINED TYPE: Primary | ICD-10-CM

## 2023-01-10 RX ORDER — DEXTROAMPHETAMINE SACCHARATE, AMPHETAMINE ASPARTATE MONOHYDRATE, DEXTROAMPHETAMINE SULFATE AND AMPHETAMINE SULFATE 6.25; 6.25; 6.25; 6.25 MG/1; MG/1; MG/1; MG/1
CAPSULE, EXTENDED RELEASE ORAL
Qty: 60 CAPSULE | Refills: 0 | Status: SHIPPED | OUTPATIENT
Start: 2023-01-10 | End: 2023-01-20 | Stop reason: RX

## 2023-01-10 RX ORDER — DEXTROAMPHETAMINE SACCHARATE, AMPHETAMINE ASPARTATE, DEXTROAMPHETAMINE SULFATE AND AMPHETAMINE SULFATE 5; 5; 5; 5 MG/1; MG/1; MG/1; MG/1
TABLET ORAL
Qty: 90 TABLET | Refills: 0 | Status: SHIPPED | OUTPATIENT
Start: 2023-01-10 | End: 2023-01-10 | Stop reason: RX

## 2023-01-10 NOTE — TELEPHONE ENCOUNTER
There were 2 messages regarding this Adderall supply issue.  I have sent in 20 mg immediate release before I saw the message that that is not a strength that is in stock.  I am sending in a new prescription for Adderall XR 25 mg to take 1 twice daily.  Please contact pharmacy and assuming they can fill the XR 25 mg, cancel 20 mg immediate release strength.

## 2023-01-10 NOTE — TELEPHONE ENCOUNTER
PATIENT CALLED AND STATES HE IS UNABLE TO FIND THE MEDICATION     amphetamine-dextroamphetamine XR (Adderall XR) 30 MG 24 hr capsule    HE HAS CHECKED WITH SEVERAL PHARMACIES AND NO ONE HAS IT IN STOCK. HE IS ASKING IF THE REGULAR ADDERALL WOULD BE AVAILABLE SINCE HE CANT FIND THE ADDERALL XR    HE HAS BEEN WITH OUT MEDICATION FOR TWO WEEKS.  PLEASE CALL AND ADVISE [phone]    [facility], [place] - [address] [facility]  - [phone] [facility] [phone]   [phone]

## 2023-01-11 ENCOUNTER — PATIENT MESSAGE (OUTPATIENT)
Dept: FAMILY MEDICINE CLINIC | Facility: CLINIC | Age: 33
End: 2023-01-11
Payer: COMMERCIAL

## 2023-01-11 DIAGNOSIS — F90.2 ATTENTION DEFICIT HYPERACTIVITY DISORDER (ADHD), COMBINED TYPE: Primary | ICD-10-CM

## 2023-01-25 RX ORDER — DEXTROAMPHETAMINE SACCHARATE, AMPHETAMINE ASPARTATE MONOHYDRATE, DEXTROAMPHETAMINE SULFATE AND AMPHETAMINE SULFATE 6.25; 6.25; 6.25; 6.25 MG/1; MG/1; MG/1; MG/1
50 CAPSULE, EXTENDED RELEASE ORAL EVERY MORNING
Qty: 60 CAPSULE | Refills: 0 | Status: SHIPPED | OUTPATIENT
Start: 2023-01-25 | End: 2023-02-07

## 2023-02-07 RX ORDER — DEXTROAMPHETAMINE SACCHARATE, AMPHETAMINE ASPARTATE MONOHYDRATE, DEXTROAMPHETAMINE SULFATE AND AMPHETAMINE SULFATE 6.25; 6.25; 6.25; 6.25 MG/1; MG/1; MG/1; MG/1
50 CAPSULE, EXTENDED RELEASE ORAL EVERY MORNING
Qty: 60 CAPSULE | Refills: 0 | Status: SHIPPED | OUTPATIENT
Start: 2023-02-07 | End: 2023-03-07 | Stop reason: SDUPTHER

## 2023-03-07 DIAGNOSIS — F90.2 ATTENTION DEFICIT HYPERACTIVITY DISORDER (ADHD), COMBINED TYPE: ICD-10-CM

## 2023-03-07 RX ORDER — DEXTROAMPHETAMINE SACCHARATE, AMPHETAMINE ASPARTATE MONOHYDRATE, DEXTROAMPHETAMINE SULFATE AND AMPHETAMINE SULFATE 6.25; 6.25; 6.25; 6.25 MG/1; MG/1; MG/1; MG/1
50 CAPSULE, EXTENDED RELEASE ORAL EVERY MORNING
Qty: 60 CAPSULE | Refills: 0 | Status: SHIPPED | OUTPATIENT
Start: 2023-03-07

## 2023-04-03 ENCOUNTER — OFFICE VISIT (OUTPATIENT)
Dept: FAMILY MEDICINE CLINIC | Facility: CLINIC | Age: 33
End: 2023-04-03
Payer: COMMERCIAL

## 2023-04-03 VITALS
SYSTOLIC BLOOD PRESSURE: 128 MMHG | DIASTOLIC BLOOD PRESSURE: 76 MMHG | OXYGEN SATURATION: 97 % | WEIGHT: 218.5 LBS | TEMPERATURE: 98.4 F | RESPIRATION RATE: 18 BRPM | HEART RATE: 115 BPM | HEIGHT: 72 IN | BODY MASS INDEX: 29.59 KG/M2

## 2023-04-03 DIAGNOSIS — F90.2 ATTENTION DEFICIT HYPERACTIVITY DISORDER (ADHD), COMBINED TYPE: Primary | ICD-10-CM

## 2023-04-03 DIAGNOSIS — F32.A ANXIETY AND DEPRESSION: ICD-10-CM

## 2023-04-03 DIAGNOSIS — Z72.0 TOBACCO USE: ICD-10-CM

## 2023-04-03 DIAGNOSIS — F41.9 ANXIETY AND DEPRESSION: ICD-10-CM

## 2023-04-03 DIAGNOSIS — K61.1 PERIRECTAL ABSCESS: ICD-10-CM

## 2023-04-03 DIAGNOSIS — E66.3 OVERWEIGHT (BMI 25.0-29.9): ICD-10-CM

## 2023-04-03 DIAGNOSIS — F43.10 PTSD (POST-TRAUMATIC STRESS DISORDER): ICD-10-CM

## 2023-04-03 RX ORDER — CLONAZEPAM 1 MG/1
1 TABLET ORAL 2 TIMES DAILY PRN
Qty: 30 TABLET | Refills: 1 | Status: SHIPPED | OUTPATIENT
Start: 2023-04-03

## 2023-04-03 RX ORDER — DULOXETIN HYDROCHLORIDE 60 MG/1
60 CAPSULE, DELAYED RELEASE ORAL DAILY
Qty: 30 CAPSULE | Refills: 3 | Status: SHIPPED | OUTPATIENT
Start: 2023-04-03

## 2023-04-03 RX ORDER — DEXTROAMPHETAMINE SACCHARATE, AMPHETAMINE ASPARTATE, DEXTROAMPHETAMINE SULFATE AND AMPHETAMINE SULFATE 7.5; 7.5; 7.5; 7.5 MG/1; MG/1; MG/1; MG/1
30 TABLET ORAL DAILY
Qty: 30 TABLET | Refills: 0 | Status: SHIPPED | OUTPATIENT
Start: 2023-04-03

## 2023-04-03 NOTE — ASSESSMENT & PLAN NOTE
Patient's (Body mass index is 29.63 kg/m².) indicates that they are overweight with health conditions that include GERD . Weight is unchanged. BMI is is above average; BMI management plan is completed. We discussed portion control and increasing exercise.

## 2023-04-03 NOTE — PROGRESS NOTES
Chief Complaint  ADHD and Cyst    History of Present Illness  Micah Nettles presents today for adhd and anxiety.    ADHD:  Symptoms include inattention, forgetfulness, careless mistakes, losing things, avoiding mental tasks and fidgeting. The symptoms occur at home, at work, during social events, in the morning , in the afternoon, in the evening and on weekends. Onset was 05/2022.. The symptoms are described as Moderate in severity. The symptoms are described as unchanged. Symptoms are exacerbated by work environment, distracting activities and conversation. Symptoms are relieved by attention holding activities and stimulant medication. Associated symptoms include anxiety disorder and major depression. Current treatment includes dextroamphetamine/amphetamine (Adderall). By report, Micah is compliant most of the time.    Anxiety/Depression  Associated symptoms include: depressed mood, insomnia, fatigue, difficulty concentrating and anxiety    Severity is described per patient : Moderate    Patient is not currently taking an antidepressant.  He has had side effects on all previous medications and insurance would not approve Pristiq.  Celexa caused erectile dysfunction, bupropion was too expensive/no longer covered by insurance, Prozac caused him to become violent, Zoloft caused him to grind his jaws.  He does have a prescription for Klonopin which she takes rarely when he cannot sleep when he is overwhelmed with anxiety.  He has had a hard time keeping supply of Adderall.      Spot on buttock  Present for about 6 months.   Has had I&D in office on 09/21/2022.  Since that time it has continued to drain spontaneously.  It will feel like he gets full, and as it starts getting painful with will typically rupture and drain  bloody fluid  Has taken Augmentin.     Current Outpatient Medications on File Prior to Visit   Medication Sig   • amphetamine-dextroamphetamine XR (Adderall XR) 25 MG 24 hr capsule Take 2 capsules  "by mouth Every Morning   • halobetasol (Ultravate) 0.05 % cream Apply  topically to the appropriate area as directed 2 (Two) Times a Day.   • meloxicam (MOBIC) 15 MG tablet 1/2 to 1 daily as needed for pain   • ondansetron (Zofran) 8 MG tablet 1/2 to 1 tablet every 8 hours if needed   • [DISCONTINUED] clonazePAM (KlonoPIN) 1 MG tablet Take 1 tablet by mouth 2 (Two) Times a Day As Needed for Anxiety. for anxiety   • [DISCONTINUED] amoxicillin-clavulanate (AUGMENTIN) 875-125 MG per tablet Take 1 tablet by mouth 2 (Two) Times a Day.     No current facility-administered medications on file prior to visit.       Objective   Vital Signs:   /76 (BP Location: Right arm, Patient Position: Sitting, Cuff Size: Adult)   Pulse 115   Temp 98.4 °F (36.9 °C) (Temporal)   Resp 18   Ht 182.9 cm (72\")   Wt 99.1 kg (218 lb 8 oz)   SpO2 97% Comment: room air  BMI 29.63 kg/m²       Physical Exam  Vitals and nursing note reviewed.   Constitutional:       General: He is not in acute distress.     Appearance: Normal appearance. He is well-developed.   HENT:      Head: Normocephalic and atraumatic.   Eyes:      Conjunctiva/sclera: Conjunctivae normal.      Pupils: Pupils are equal, round, and reactive to light.   Neck:      Thyroid: No thyromegaly.      Vascular: No JVD.   Cardiovascular:      Rate and Rhythm: Normal rate and regular rhythm.      Heart sounds: Normal heart sounds. No murmur heard.  Pulmonary:      Breath sounds: Normal breath sounds. No wheezing or rales.   Musculoskeletal:         General: Normal range of motion.      Cervical back: Normal range of motion.   Lymphadenopathy:      Cervical: No cervical adenopathy.   Skin:     General: Skin is warm and dry.      Findings: No rash.      Comments: Patient declines examination of perirectal abscess.  Does show tissue that has a small amount of clotted blood and yellowish serous drainage.   Neurological:      Mental Status: He is alert and oriented to person, place, " and time.   Psychiatric:         Mood and Affect: Mood normal.         Behavior: Behavior normal.      Comments: Anxious and somewhat agitated            No visits with results within 1 Day(s) from this visit.   Latest known visit with results is:   No results found for any previous visit.       No results found for: CHOL, CHLPL, TRIG, HDL, LDL, LDLDIRECT  No results found for: TSH, X3AMWIA, A7TBPDG, THYROIDAB  No results found for: GLUCOSE, BUN, CREATININE, EGFRIFNONA, EGFRIFAFRI, BCR, K, CO2, CALCIUM, PROTENTOTREF, ALBUMIN, LABIL2, BILIRUBIN, AST, ALT  No results found for: WBC, HGB, HCT, MCV, PLT                   Assessment and Plan    Diagnoses and all orders for this visit:    1. Attention deficit hyperactivity disorder (ADHD), combined type (Primary)  -     amphetamine-dextroamphetamine (Adderall) 30 MG tablet; Take 1 tablet by mouth Daily.  Dispense: 30 tablet; Refill: 0    2. Anxiety and depression  -     DULoxetine (Cymbalta) 60 MG capsule; Take 1 capsule by mouth Daily.  Dispense: 30 capsule; Refill: 3  -     clonazePAM (KlonoPIN) 1 MG tablet; Take 1 tablet by mouth 2 (Two) Times a Day As Needed for Anxiety. for anxiety  Dispense: 30 tablet; Refill: 1    3. Perirectal abscess    4. Overweight (BMI 25.0-29.9)  Assessment & Plan:  Patient's (Body mass index is 29.63 kg/m².) indicates that they are overweight with health conditions that include GERD . Weight is unchanged. BMI is is above average; BMI management plan is completed. We discussed portion control and increasing exercise.       5. Tobacco use    6. PTSD (post-traumatic stress disorder)  -     clonazePAM (KlonoPIN) 1 MG tablet; Take 1 tablet by mouth 2 (Two) Times a Day As Needed for Anxiety. for anxiety  Dispense: 30 tablet; Refill: 1  Anxiety and depression, new prescription for Cymbalta would like him to return in 1 month however patient is struggling financially and advised as long as he keeps appointments at least every 3 months can continue  current therapy.  Did encourage starting back with counseling to help deal with life stresses.  Perirectal abscess recurrent following I&D in office, patient declines referral to surgery for treatment    Medications Discontinued During This Encounter   Medication Reason   • amoxicillin-clavulanate (AUGMENTIN) 875-125 MG per tablet *Therapy completed   • clonazePAM (KlonoPIN) 1 MG tablet Reorder         Follow Up     Return for Recheck, depression, anxiety, ADHD in 1 to 3 months and as needed for perirectal abscess..    Patient was given instructions and counseling regarding his condition or for health maintenance advice. Please see specific information pulled into the AVS if appropriate.

## 2023-05-08 DIAGNOSIS — F90.2 ATTENTION DEFICIT HYPERACTIVITY DISORDER (ADHD), COMBINED TYPE: ICD-10-CM

## 2023-05-08 DIAGNOSIS — F32.A ANXIETY AND DEPRESSION: ICD-10-CM

## 2023-05-08 DIAGNOSIS — F41.9 ANXIETY AND DEPRESSION: ICD-10-CM

## 2023-05-08 DIAGNOSIS — F43.10 PTSD (POST-TRAUMATIC STRESS DISORDER): ICD-10-CM

## 2023-05-08 RX ORDER — CLONAZEPAM 1 MG/1
1 TABLET ORAL 2 TIMES DAILY PRN
Qty: 30 TABLET | Refills: 1 | Status: SHIPPED | OUTPATIENT
Start: 2023-05-08

## 2023-05-08 RX ORDER — DEXTROAMPHETAMINE SACCHARATE, AMPHETAMINE ASPARTATE, DEXTROAMPHETAMINE SULFATE AND AMPHETAMINE SULFATE 7.5; 7.5; 7.5; 7.5 MG/1; MG/1; MG/1; MG/1
30 TABLET ORAL DAILY
Qty: 30 TABLET | Refills: 0 | Status: SHIPPED | OUTPATIENT
Start: 2023-05-08

## 2023-06-08 DIAGNOSIS — F90.2 ATTENTION DEFICIT HYPERACTIVITY DISORDER (ADHD), COMBINED TYPE: ICD-10-CM

## 2023-06-09 RX ORDER — DEXTROAMPHETAMINE SACCHARATE, AMPHETAMINE ASPARTATE, DEXTROAMPHETAMINE SULFATE AND AMPHETAMINE SULFATE 7.5; 7.5; 7.5; 7.5 MG/1; MG/1; MG/1; MG/1
30 TABLET ORAL DAILY
Qty: 30 TABLET | Refills: 0 | Status: SHIPPED | OUTPATIENT
Start: 2023-06-09

## 2023-06-14 DIAGNOSIS — K61.1 PERIRECTAL ABSCESS: Primary | ICD-10-CM

## 2023-06-27 PROBLEM — K60.3 FISTULA-IN-ANO: Status: ACTIVE | Noted: 2023-06-27

## 2023-06-27 PROBLEM — K60.30 FISTULA-IN-ANO: Status: ACTIVE | Noted: 2023-06-27

## 2023-08-01 ENCOUNTER — ANESTHESIA EVENT (OUTPATIENT)
Dept: PERIOP | Facility: HOSPITAL | Age: 33
End: 2023-08-01
Payer: COMMERCIAL

## 2023-08-02 ENCOUNTER — ANESTHESIA (OUTPATIENT)
Dept: PERIOP | Facility: HOSPITAL | Age: 33
End: 2023-08-02
Payer: COMMERCIAL

## 2023-08-02 ENCOUNTER — HOSPITAL ENCOUNTER (OUTPATIENT)
Facility: HOSPITAL | Age: 33
Setting detail: HOSPITAL OUTPATIENT SURGERY
Discharge: HOME | End: 2023-08-02
Attending: SURGERY | Admitting: SURGERY
Payer: COMMERCIAL

## 2023-08-02 VITALS
OXYGEN SATURATION: 96 % | BODY MASS INDEX: 28.06 KG/M2 | HEART RATE: 72 BPM | HEIGHT: 72 IN | RESPIRATION RATE: 20 BRPM | WEIGHT: 207.2 LBS | TEMPERATURE: 97.6 F | DIASTOLIC BLOOD PRESSURE: 89 MMHG | SYSTOLIC BLOOD PRESSURE: 126 MMHG

## 2023-08-02 DIAGNOSIS — K60.3 FISTULA-IN-ANO: ICD-10-CM

## 2023-08-02 PROBLEM — K60.30 FISTULA-IN-ANO: Status: RESOLVED | Noted: 2023-06-27 | Resolved: 2023-08-02

## 2023-08-02 PROCEDURE — 25010000002 ONDANSETRON PER 1 MG

## 2023-08-02 PROCEDURE — 25010000002 ONDANSETRON PER 1 MG: Performed by: ANESTHESIOLOGY

## 2023-08-02 PROCEDURE — 46270 REMOVE ANAL FIST SUBQ: CPT | Performed by: SURGERY

## 2023-08-02 PROCEDURE — 25010000002 PROPOFOL 10 MG/ML EMULSION

## 2023-08-02 PROCEDURE — 25010000002 LORAZEPAM PER 2 MG: Performed by: ANESTHESIOLOGY

## 2023-08-02 PROCEDURE — S0260 H&P FOR SURGERY: HCPCS | Performed by: SURGERY

## 2023-08-02 PROCEDURE — 25010000002 DEXAMETHASONE SODIUM PHOSPHATE 10 MG/ML SOLUTION

## 2023-08-02 PROCEDURE — 0 LIDOCAINE 1 % SOLUTION: Performed by: ANESTHESIOLOGY

## 2023-08-02 PROCEDURE — 25010000002 BUPIVACAINE 0.25 % SOLUTION: Performed by: SURGERY

## 2023-08-02 PROCEDURE — 25010000002 FENTANYL CITRATE (PF) 100 MCG/2ML SOLUTION

## 2023-08-02 RX ORDER — DEXAMETHASONE SODIUM PHOSPHATE 10 MG/ML
INJECTION, SOLUTION INTRAMUSCULAR; INTRAVENOUS AS NEEDED
Status: DISCONTINUED | OUTPATIENT
Start: 2023-08-02 | End: 2023-08-02 | Stop reason: SURG

## 2023-08-02 RX ORDER — FENTANYL CITRATE 50 UG/ML
25 INJECTION, SOLUTION INTRAMUSCULAR; INTRAVENOUS
Status: DISCONTINUED | OUTPATIENT
Start: 2023-08-02 | End: 2023-08-02 | Stop reason: HOSPADM

## 2023-08-02 RX ORDER — DIPHENHYDRAMINE HYDROCHLORIDE 50 MG/ML
12.5 INJECTION INTRAMUSCULAR; INTRAVENOUS
Status: DISCONTINUED | OUTPATIENT
Start: 2023-08-02 | End: 2023-08-02 | Stop reason: HOSPADM

## 2023-08-02 RX ORDER — SODIUM CHLORIDE 0.9 % (FLUSH) 0.9 %
3-10 SYRINGE (ML) INJECTION AS NEEDED
Status: DISCONTINUED | OUTPATIENT
Start: 2023-08-02 | End: 2023-08-02 | Stop reason: HOSPADM

## 2023-08-02 RX ORDER — LIDOCAINE HYDROCHLORIDE 10 MG/ML
0.5 INJECTION, SOLUTION INFILTRATION; PERINEURAL ONCE AS NEEDED
Status: COMPLETED | OUTPATIENT
Start: 2023-08-02 | End: 2023-08-02

## 2023-08-02 RX ORDER — DEXMEDETOMIDINE HYDROCHLORIDE 100 UG/ML
INJECTION, SOLUTION INTRAVENOUS AS NEEDED
Status: DISCONTINUED | OUTPATIENT
Start: 2023-08-02 | End: 2023-08-02 | Stop reason: SURG

## 2023-08-02 RX ORDER — FENTANYL CITRATE 50 UG/ML
INJECTION, SOLUTION INTRAMUSCULAR; INTRAVENOUS AS NEEDED
Status: DISCONTINUED | OUTPATIENT
Start: 2023-08-02 | End: 2023-08-02 | Stop reason: SURG

## 2023-08-02 RX ORDER — FENTANYL CITRATE 50 UG/ML
50 INJECTION, SOLUTION INTRAMUSCULAR; INTRAVENOUS
Status: DISCONTINUED | OUTPATIENT
Start: 2023-08-02 | End: 2023-08-02 | Stop reason: HOSPADM

## 2023-08-02 RX ORDER — NALOXONE HCL 0.4 MG/ML
0.4 VIAL (ML) INJECTION AS NEEDED
Status: DISCONTINUED | OUTPATIENT
Start: 2023-08-02 | End: 2023-08-02 | Stop reason: HOSPADM

## 2023-08-02 RX ORDER — ONDANSETRON 2 MG/ML
INJECTION INTRAMUSCULAR; INTRAVENOUS AS NEEDED
Status: DISCONTINUED | OUTPATIENT
Start: 2023-08-02 | End: 2023-08-02 | Stop reason: SURG

## 2023-08-02 RX ORDER — SODIUM CHLORIDE 9 MG/ML
40 INJECTION, SOLUTION INTRAVENOUS AS NEEDED
Status: DISCONTINUED | OUTPATIENT
Start: 2023-08-02 | End: 2023-08-02 | Stop reason: HOSPADM

## 2023-08-02 RX ORDER — LIDOCAINE HYDROCHLORIDE 20 MG/ML
INJECTION, SOLUTION EPIDURAL; INFILTRATION; INTRACAUDAL; PERINEURAL AS NEEDED
Status: DISCONTINUED | OUTPATIENT
Start: 2023-08-02 | End: 2023-08-02 | Stop reason: SURG

## 2023-08-02 RX ORDER — HYDROCODONE BITARTRATE AND ACETAMINOPHEN 5; 325 MG/1; MG/1
1 TABLET ORAL EVERY 6 HOURS PRN
Qty: 10 TABLET | Refills: 0 | Status: SHIPPED | OUTPATIENT
Start: 2023-08-02 | End: 2023-08-07

## 2023-08-02 RX ORDER — LORAZEPAM 2 MG/ML
2 INJECTION INTRAMUSCULAR ONCE
Status: COMPLETED | OUTPATIENT
Start: 2023-08-02 | End: 2023-08-02

## 2023-08-02 RX ORDER — SODIUM CHLORIDE 0.9 % (FLUSH) 0.9 %
10 SYRINGE (ML) INJECTION AS NEEDED
Status: DISCONTINUED | OUTPATIENT
Start: 2023-08-02 | End: 2023-08-02 | Stop reason: HOSPADM

## 2023-08-02 RX ORDER — BUPIVACAINE HYDROCHLORIDE 2.5 MG/ML
INJECTION, SOLUTION INFILTRATION; PERINEURAL AS NEEDED
Status: DISCONTINUED | OUTPATIENT
Start: 2023-08-02 | End: 2023-08-02 | Stop reason: HOSPADM

## 2023-08-02 RX ORDER — SODIUM CHLORIDE, SODIUM LACTATE, POTASSIUM CHLORIDE, CALCIUM CHLORIDE 600; 310; 30; 20 MG/100ML; MG/100ML; MG/100ML; MG/100ML
INJECTION, SOLUTION INTRAVENOUS CONTINUOUS PRN
Status: DISCONTINUED | OUTPATIENT
Start: 2023-08-02 | End: 2023-08-02 | Stop reason: SURG

## 2023-08-02 RX ORDER — ALBUTEROL SULFATE 2.5 MG/3ML
2.5 SOLUTION RESPIRATORY (INHALATION) ONCE AS NEEDED
Status: DISCONTINUED | OUTPATIENT
Start: 2023-08-02 | End: 2023-08-02 | Stop reason: HOSPADM

## 2023-08-02 RX ORDER — SODIUM CHLORIDE 0.9 % (FLUSH) 0.9 %
3 SYRINGE (ML) INJECTION EVERY 12 HOURS SCHEDULED
Status: DISCONTINUED | OUTPATIENT
Start: 2023-08-02 | End: 2023-08-02 | Stop reason: HOSPADM

## 2023-08-02 RX ORDER — LABETALOL HYDROCHLORIDE 5 MG/ML
5 INJECTION, SOLUTION INTRAVENOUS
Status: DISCONTINUED | OUTPATIENT
Start: 2023-08-02 | End: 2023-08-02 | Stop reason: HOSPADM

## 2023-08-02 RX ORDER — SODIUM CHLORIDE, SODIUM LACTATE, POTASSIUM CHLORIDE, CALCIUM CHLORIDE 600; 310; 30; 20 MG/100ML; MG/100ML; MG/100ML; MG/100ML
20 INJECTION, SOLUTION INTRAVENOUS ONCE
Status: COMPLETED | OUTPATIENT
Start: 2023-08-02 | End: 2023-08-02

## 2023-08-02 RX ORDER — ONDANSETRON 2 MG/ML
4 INJECTION INTRAMUSCULAR; INTRAVENOUS ONCE AS NEEDED
Status: DISCONTINUED | OUTPATIENT
Start: 2023-08-02 | End: 2023-08-02 | Stop reason: HOSPADM

## 2023-08-02 RX ORDER — PROPOFOL 10 MG/ML
VIAL (ML) INTRAVENOUS AS NEEDED
Status: DISCONTINUED | OUTPATIENT
Start: 2023-08-02 | End: 2023-08-02 | Stop reason: SURG

## 2023-08-02 RX ORDER — SODIUM CHLORIDE 9 MG/ML
100 INJECTION, SOLUTION INTRAVENOUS CONTINUOUS
Status: DISCONTINUED | OUTPATIENT
Start: 2023-08-02 | End: 2023-08-02 | Stop reason: HOSPADM

## 2023-08-02 RX ORDER — ACETAMINOPHEN 325 MG/1
650 TABLET ORAL ONCE AS NEEDED
Status: DISCONTINUED | OUTPATIENT
Start: 2023-08-02 | End: 2023-08-02 | Stop reason: HOSPADM

## 2023-08-02 RX ORDER — ONDANSETRON 2 MG/ML
4 INJECTION INTRAMUSCULAR; INTRAVENOUS ONCE
Status: COMPLETED | OUTPATIENT
Start: 2023-08-02 | End: 2023-08-02

## 2023-08-02 RX ADMIN — ONDANSETRON 4 MG: 2 INJECTION INTRAMUSCULAR; INTRAVENOUS at 11:01

## 2023-08-02 RX ADMIN — ONDANSETRON 4 MG: 2 INJECTION INTRAMUSCULAR; INTRAVENOUS at 13:56

## 2023-08-02 RX ADMIN — SODIUM CHLORIDE, SODIUM LACTATE, POTASSIUM CHLORIDE, AND CALCIUM CHLORIDE: .6; .31; .03; .02 INJECTION, SOLUTION INTRAVENOUS at 13:24

## 2023-08-02 RX ADMIN — SODIUM CHLORIDE, POTASSIUM CHLORIDE, SODIUM LACTATE AND CALCIUM CHLORIDE 20 ML/HR: 600; 310; 30; 20 INJECTION, SOLUTION INTRAVENOUS at 10:04

## 2023-08-02 RX ADMIN — LIDOCAINE HYDROCHLORIDE 100 MG: 20 INJECTION, SOLUTION EPIDURAL; INFILTRATION; INTRACAUDAL; PERINEURAL at 13:29

## 2023-08-02 RX ADMIN — LIDOCAINE HYDROCHLORIDE 0.5 ML: 10 INJECTION, SOLUTION INFILTRATION; PERINEURAL at 10:04

## 2023-08-02 RX ADMIN — PROPOFOL 200 MG: 10 INJECTION, EMULSION INTRAVENOUS at 13:29

## 2023-08-02 RX ADMIN — FENTANYL CITRATE 100 MCG: 50 INJECTION, SOLUTION INTRAMUSCULAR; INTRAVENOUS at 13:29

## 2023-08-02 RX ADMIN — DEXMEDETOMIDINE HYDROCHLORIDE 4 MCG: 100 INJECTION, SOLUTION INTRAVENOUS at 13:35

## 2023-08-02 RX ADMIN — FENTANYL CITRATE 50 MCG: 50 INJECTION, SOLUTION INTRAMUSCULAR; INTRAVENOUS at 13:46

## 2023-08-02 RX ADMIN — DEXAMETHASONE SODIUM PHOSPHATE 8 MG: 10 INJECTION, SOLUTION INTRAMUSCULAR; INTRAVENOUS at 13:33

## 2023-08-02 RX ADMIN — LORAZEPAM 2 MG: 2 INJECTION INTRAMUSCULAR; INTRAVENOUS at 11:09

## 2023-08-02 RX ADMIN — FENTANYL CITRATE 50 MCG: 50 INJECTION, SOLUTION INTRAMUSCULAR; INTRAVENOUS at 13:35

## 2023-08-02 RX ADMIN — DEXMEDETOMIDINE HYDROCHLORIDE 10 MCG: 100 INJECTION, SOLUTION INTRAVENOUS at 13:26

## 2023-08-02 NOTE — DISCHARGE INSTRUCTIONS
Soreness and some bleeding is normal for first 2 wks  Followup in 2 wks  Clean area daily with soap and water and clean well after bowel movements  Take over the counter stool softeners twice daily while on narcotics  If pain is mild use over the counter tylenol or ibuprofen for pain as they do not make you drowsy or constipated

## 2023-08-02 NOTE — H&P
General Surgery History and Physical       Referring Provider: Hernando Soni MD     Chief Complaint:    Perirectal abscess     History of Present Illness:    Micah Nettles is a 33 y.o. male previously seen in the clinic and was noted to have a possible perianal fistula.  He presents today for surgery.  No changes since he was last seen in clinic.     Past Medical History:   Medical History        Past Medical History:   Diagnosis Date    Alcoholism January 2023    Anxiety 05/16/2018    Depression 05/16/2018    GERD (gastroesophageal reflux disease) 05/16/2018    PTSD (post-traumatic stress disorder) 05/16/2018    Rectal bleeding June 1, 2023     Off and on throughout adulthood         Past Surgical History:    Surgical History         Past Surgical History:   Procedure Laterality Date    COLONOSCOPY        ENDOSCOPY             Family History:          Family History   Problem Relation Age of Onset    Migraines Mother      Diabetes Mother      Alcohol abuse Father      Hypertension Father      Arthritis Father      Cervical cancer Sister      Alzheimer's disease Maternal Grandmother        Social History:    Social History   Social History            Socioeconomic History    Marital status: Single   Tobacco Use    Smoking status: Every Day       Packs/day: 1.00       Years: 18.00       Pack years: 18.00       Types: Cigarettes       Start date: 1/1/2007    Smokeless tobacco: Never   Vaping Use    Vaping Use: Never used   Substance and Sexual Activity    Alcohol use: Yes       Alcohol/week: 8.0 standard drinks       Types: 8 Shots of liquor per week       Comment: occasioanally    Drug use: No    Sexual activity: Yes       Partners: Female       Birth control/protection: Condom         Allergies:         Allergies   Allergen Reactions    Bee Venom Anaphylaxis    Celexa [Citalopram] Other (See Comments)       erectile dysfunction       Medications:      Current Outpatient Medications:      amphetamine-dextroamphetamine (Adderall) 30 MG tablet, Take 1 tablet by mouth Daily., Disp: 30 tablet, Rfl: 0    amphetamine-dextroamphetamine XR (Adderall XR) 25 MG 24 hr capsule, Take 2 capsules by mouth Every Morning, Disp: 60 capsule, Rfl: 0    clonazePAM (KlonoPIN) 1 MG tablet, Take 1 tablet by mouth 2 (Two) Times a Day As Needed for Anxiety. for anxiety, Disp: 30 tablet, Rfl: 0    DULoxetine (Cymbalta) 60 MG capsule, Take 1 capsule by mouth Daily., Disp: 30 capsule, Rfl: 3    halobetasol (Ultravate) 0.05 % cream, Apply  topically to the appropriate area as directed 2 (Two) Times a Day., Disp: 50 g, Rfl: 1    meloxicam (MOBIC) 15 MG tablet, 1/2 to 1 daily as needed for pain, Disp: 30 tablet, Rfl: 2    ondansetron (Zofran) 8 MG tablet, 1/2 to 1 tablet every 8 hours if needed, Disp: 18 tablet, Rfl: 5     Radiology/Endoscopy:    None     Labs:    None recent  Review of Systems:   As noted above in HPI     Physical Exam:   No acute distress, alert  Nonlabored respirations  Perianal region without any obvious abnormalities, no perineal swelling at this time, possible small pinpoint opening at the anterior anal margin  DWAINE without any obvious masses  Extremities warm and well-perfused with no gross deformities     Assessment and Plan:  Micah Nettles is a 33 y.o. with intermittent swelling and drainage from the perianal region and in the perineum.  Based on patient's description appears consistent with a possible perianal fistula and on exam there may be a very small opening in the anterior midline.     - Discussed option for EUA to evaluate for possible fistula and fistula found fistulotomy versus seton  - The procedure along with associated risk, benefits, alternatives were discussed with the patient; we did discuss if fistula is found and it is rather deep that this may require multiple trips to the operating room  - Patient expressed understanding wishes to proceed with surgery     Debra Mary MD  General  Surgery

## 2023-08-03 NOTE — OP NOTE
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  Anorectal exam under anesthesia was recommended.  The surgery along with the risks, benefits, and alternatives to surgery were discussed.  The patient verbalized understanding and wished to proceed with surgery.  Informed consent was obtained.    DESCRIPTION OF PROCEDURE:  The patient was brought to the operating room and placed in the supine position on the operating room table.   Bilateral sequential compression stockings were placed on the lower extremities.  The patient was inducted and his airway was secured by the Anesthesia team.  The patient was then placed in the lithotomy position to expose the perineum and perianal region.  The patient's perineum and perianal regions were then prepped and draped in the usual sterile fashion.  A time out was performed.    We began with an external exam.  Anteriorly there was a very small opening consistent with possible fistula.  We then performed a digital rectal exam which was normal.  There was no obvious fistulous tract appreciated.  We inspected a bivalve retractor and inspected the anal canal and rectum circumferentially.  Anteriorly there did appear to be very mild inflammation near  the dentate line.  A probe was introduced through the external opening and it did track into the area of inflammation internally.  We palpated over the probe and there was minimal soft tissue overlying the probe.  There was no sphincter involvement.  The fistula tract was splayed open with electrocautery over the probe.  A small amount of oozing from the area was controlled with electrocautery.  Once we are satisfied with hemostasis the area was anesthetized with local anesthetic.    The patient tolerated the procedure well.  He was subsequently awakened by anesthesia and then transferred to the recovery room in stable condition.  At the completion of the procedure all needle, instrument, and sponge counts were correct.    Debra Mary MD     Date: 8/3/2023  Time: 09:35 EDT    This note was created using Dragon Voice Recognition software.

## 2023-08-08 ENCOUNTER — TELEPHONE (OUTPATIENT)
Dept: SURGERY | Facility: CLINIC | Age: 33
End: 2023-08-08
Payer: COMMERCIAL

## 2023-08-15 ENCOUNTER — OFFICE VISIT (OUTPATIENT)
Dept: SURGERY | Facility: CLINIC | Age: 33
End: 2023-08-15
Payer: COMMERCIAL

## 2023-08-15 VITALS
WEIGHT: 216 LBS | HEART RATE: 94 BPM | TEMPERATURE: 98.2 F | SYSTOLIC BLOOD PRESSURE: 142 MMHG | DIASTOLIC BLOOD PRESSURE: 95 MMHG | BODY MASS INDEX: 29.26 KG/M2 | HEIGHT: 72 IN | OXYGEN SATURATION: 98 %

## 2023-08-15 DIAGNOSIS — Z09 POSTOP CHECK: Primary | ICD-10-CM

## 2023-08-15 PROCEDURE — 99024 POSTOP FOLLOW-UP VISIT: CPT | Performed by: SURGERY

## 2023-08-15 NOTE — PROGRESS NOTES
"General Surgery Post-Operative Follow Up Note     Subjective:  Micah Nettles is a 33 y.o. year old male here for post-operative follow up.  He reports he is doing well.  Denies any pain at this time.  Still having a small amount of serosanguineous drainage intermittently which is decreasing.    Procedure:    EUA and fistulotomy, 8/2/2023    Vitals:  /95   Pulse 94   Temp 98.2 øF (36.8 øC) (Infrared)   Ht 182.9 cm (72\")   Wt 98 kg (216 lb)   SpO2 98%   BMI 29.29 kg/mý      Physical Exam:   NAD, alert  Nonlabored respirations  Rectal-incision appears to be healing well, no signs of infection    Assessment and Plan:  Micah Nettles is a 33 y.o. year old male status post EUA and fistulotomy, doing well.    -Followup on PRN basis    Debra Mary M.D.  General Surgery  "

## 2023-08-17 DIAGNOSIS — R11.0 NAUSEA: ICD-10-CM

## 2023-08-17 DIAGNOSIS — F90.2 ATTENTION DEFICIT HYPERACTIVITY DISORDER (ADHD), COMBINED TYPE: ICD-10-CM

## 2023-08-18 RX ORDER — DEXTROAMPHETAMINE SACCHARATE, AMPHETAMINE ASPARTATE, DEXTROAMPHETAMINE SULFATE AND AMPHETAMINE SULFATE 5; 5; 5; 5 MG/1; MG/1; MG/1; MG/1
TABLET ORAL
Qty: 60 TABLET | Refills: 0 | Status: SHIPPED | OUTPATIENT
Start: 2023-08-18

## 2023-08-18 RX ORDER — ONDANSETRON HYDROCHLORIDE 8 MG/1
TABLET, FILM COATED ORAL
Qty: 30 TABLET | Refills: 1 | Status: SHIPPED | OUTPATIENT
Start: 2023-08-18

## 2023-10-03 NOTE — PROGRESS NOTES
Chief Complaint  Anxiety, ADHD, and Arm pit pain    History of Present Illness  Micah Nettles presents today for ADHD, anxiety, and arm pit pain.    ADHD:  Symptoms include inattention, forgetfulness, and losing things. The symptoms occur at home, at work, during social events, in the morning , in the afternoon, in the evening, and on weekends. Onset was 1-2 years ago. The symptoms are described as Severe in severity. The symptoms are described as rapidly improving. Symptoms are exacerbated by work environment and distracting activities. Symptoms are relieved by stimulant medication. Associated symptoms include anxiety disorder. Current treatment includes dextroamphetamine/amphetamine (Adderall). By report, Micah is compliant most of the time.     Anxiety: Patient complains of anxiety disorder, panic attacks, post traumatic stress  disorder, and sleep disturbance.  He has the following symptoms: chest pain, difficulty concentrating, dizziness, fatigue, feelings of losing control, insomnia, irritable, racing thoughts, shortness of breath, sweating. Onset of symptoms was approximately  2012 . gradually improving since that time. He denies current suicidal and homicidal ideation. Family history significant for alcoholism, anxiety, depression, heart disease, and substance abuse.Possible organic causes contributing are: none. Risk factors: negative life event. Previous treatment includes Klonopin typicly 3 times a week.  He complains of the following side effects from the treatment: dry mouth and fatigue .   Increased stress with Dad who lives with him , has been in the hospital for over 1 month. Recent diagnosis of throat cancer, has trachiotomy, got gangreen and had toe amputations bilaterally. [employer] is closing on March 1st. May be selling to another company ie [employer] and may be able to keep same job but up in the air.     Patient states they began having swelling and pain under their armpit around 10/1/23. They  "also stated they had some swelling and pain in both of their nipples around 10/3/23.    Influenza immunization was not given due to patient refusal.     Patient Care Team:  Renée Bello MD as PCP - General (Family Medicine)  Debra Mary MD as Surgeon (General Surgery)   Current Outpatient Medications on File Prior to Visit   Medication Sig    halobetasol (Ultravate) 0.05 % cream Apply  topically to the appropriate area as directed 2 (Two) Times a Day. (Patient not taking: Reported on 10/9/2023)     No current facility-administered medications on file prior to visit.       Objective   Vital Signs:   /78 (BP Location: Right arm, Patient Position: Sitting, Cuff Size: Large Adult)   Pulse 104   Temp 98.2 øF (36.8 øC) (Temporal)   Resp 18   Ht 182.9 cm (72\")   Wt 101 kg (222 lb)   SpO2 99%   BMI 30.11 kg/mý          Physical Exam  Vitals and nursing note reviewed.   Constitutional:       General: He is not in acute distress.     Appearance: Normal appearance. He is well-developed.   HENT:      Head: Normocephalic and atraumatic.   Eyes:      Conjunctiva/sclera: Conjunctivae normal.      Pupils: Pupils are equal, round, and reactive to light.   Neck:      Thyroid: No thyromegaly.      Vascular: No JVD.   Cardiovascular:      Rate and Rhythm: Normal rate and regular rhythm.      Heart sounds: Normal heart sounds. No murmur heard.  Pulmonary:      Breath sounds: Normal breath sounds. No wheezing or rales.      Comments: Left chest just above the nipple is a nontender 1/2 cm kidney bean shaped subcutaneous nodule.  Musculoskeletal:         General: Normal range of motion.      Cervical back: Normal range of motion.   Lymphadenopathy:      Cervical: No cervical adenopathy.   Skin:     General: Skin is warm and dry.      Findings: No rash.      Comments: Left axilla without palpable abnormality  Right axilla with 4 tender superficial subcutaneous nonfluctuant erythematous cystic lesions.  Multiple " "excoriations and small ulcers on upper extremities bilaterally   Neurological:      Mental Status: He is alert and oriented to person, place, and time.   Psychiatric:         Mood and Affect: Mood normal.         Behavior: Behavior normal.            No visits with results within 1 Day(s) from this visit.   Latest known visit with results is:   Lab on 07/08/2023   Component Date Value Ref Range Status    WBC 07/08/2023 6.89  3.40 - 10.80 10*3/mm3 Final    RBC 07/08/2023 4.67  4.14 - 5.80 10*6/mm3 Final    Hemoglobin 07/08/2023 15.8  13.0 - 17.7 g/dL Final    Hematocrit 07/08/2023 45.1  37.5 - 51.0 % Final    MCV 07/08/2023 96.6  79.0 - 97.0 fL Final    MCH 07/08/2023 33.8 (H)  26.6 - 33.0 pg Final    MCHC 07/08/2023 35.0  31.5 - 35.7 g/dL Final    RDW 07/08/2023 14.3  12.3 - 15.4 % Final    RDW-SD 07/08/2023 50.3  37.0 - 54.0 fl Final    MPV 07/08/2023 10.1  6.0 - 12.0 fL Final    Platelets 07/08/2023 253  140 - 450 10*3/mm3 Final       No results found for: \"CHOL\", \"CHLPL\", \"TRIG\", \"HDL\", \"LDL\", \"LDLDIRECT\"  No results found for: \"TSH\", \"N4SJIAZ\", \"Y0AQBEQ\", \"THYROIDAB\"  No results found for: \"GLUCOSE\", \"BUN\", \"CREATININE\", \"EGFRIFNONA\", \"EGFRIFAFRI\", \"BCR\", \"K\", \"CO2\", \"CALCIUM\", \"PROTENTOTREF\", \"ALBUMIN\", \"LABIL2\", \"BILIRUBIN\", \"AST\", \"ALT\"  Lab Results   Component Value Date    WBC 6.89 07/08/2023    HGB 15.8 07/08/2023    HCT 45.1 07/08/2023    MCV 96.6 07/08/2023     07/08/2023                      Assessment and Plan    Diagnoses and all orders for this visit:    1. Attention deficit hyperactivity disorder (ADHD), combined type (Primary)  -     amphetamine-dextroamphetamine (Adderall) 20 MG tablet; 1 at 10 am and 1 at 2 pm  Dispense: 60 tablet; Refill: 0    2. Anxiety and depression  -     clonazePAM (KlonoPIN) 1 MG tablet; Take 1 tablet by mouth 2 (Two) Times a Day As Needed for Anxiety. for anxiety  Dispense: 30 tablet; Refill: 0    3. Overweight (BMI 25.0-29.9)    4. Tobacco use    5. Nausea  -     " ondansetron (Zofran) 8 MG tablet; 1/2 to 1 tablet every 8 hours if needed  Dispense: 30 tablet; Refill: 1    6. Pain in axilla, unspecified laterality    7. PTSD (post-traumatic stress disorder)  -     clonazePAM (KlonoPIN) 1 MG tablet; Take 1 tablet by mouth 2 (Two) Times a Day As Needed for Anxiety. for anxiety  Dispense: 30 tablet; Refill: 0    8. Furuncle of right axilla    Other orders  -     sulfamethoxazole-trimethoprim (Bactrim DS) 800-160 MG per tablet; Take 1 tablet by mouth 2 (Two) Times a Day.  Dispense: 14 tablet; Refill: 0  -     famotidine (PEPCID) 40 MG tablet; Take 1 tablet by mouth Every Night.  Dispense: 30 tablet; Refill: 12    BMI is >= 30 and <35. (Class 1 Obesity). The following options were offered after discussion;: exercise counseling/recommendations and nutrition counseling/recommendations   Did encourage immunizations including influenza, pneumococcal, hep C, and COVID vaccinations.  Patient declined influenza vaccination today.  Multiple subcutaneous cystic lesions consistent with small abscesses left axilla and nipples have improved right axilla persistent treated with Bactrim and recommended bleach baths of  one half cup bleach to full tub water soaking to the chin for 10 minutes once a month for skin decontamination.  Recommend referral to dermatology, patient declines at this time.  Patient has had upper extremity excoriated lesions most consistent with eczema possibly consistent with psoriasis responding partially to topical steroids.    Reflux not currenlty on antacid. Patient had an EGD performed at [facility] by [facility].  Consultation note October 13 2021 has been reviewed.  Could not initially locate EGD or path report have asked medical assistant to help locate for review.  Restarting daily antacid Pepcid 40 mg.  Renewing ondansetron for as needed use.  Patient reports he apparently has quantity limit of either 13 or 18  tablets/month.    Anxiety, ADHD, and PTSD relatively well controlled with Adderall with occasional Klonopin use.  He is no longer taking a daily antidepressant.  Continue current therapy.    Medications Discontinued During This Encounter   Medication Reason    clonazePAM (KlonoPIN) 1 MG tablet Reorder    ondansetron (Zofran) 8 MG tablet Reorder    amphetamine-dextroamphetamine (Adderall) 20 MG tablet Reorder         Follow Up     Return in about 3 months (around 1/9/2024) for Recheck adhd, gerd, nausea, anxiety, skin concern.    Patient was given instructions and counseling regarding his condition or for health maintenance advice. Please see specific information pulled into the AVS if appropriate.

## 2023-10-09 ENCOUNTER — OFFICE VISIT (OUTPATIENT)
Dept: FAMILY MEDICINE CLINIC | Facility: CLINIC | Age: 33
End: 2023-10-09
Payer: COMMERCIAL

## 2023-10-09 VITALS
RESPIRATION RATE: 18 BRPM | HEART RATE: 104 BPM | WEIGHT: 222 LBS | HEIGHT: 72 IN | DIASTOLIC BLOOD PRESSURE: 78 MMHG | TEMPERATURE: 98.2 F | OXYGEN SATURATION: 99 % | BODY MASS INDEX: 30.07 KG/M2 | SYSTOLIC BLOOD PRESSURE: 136 MMHG

## 2023-10-09 DIAGNOSIS — M79.629 PAIN IN AXILLA, UNSPECIFIED LATERALITY: ICD-10-CM

## 2023-10-09 DIAGNOSIS — F41.9 ANXIETY AND DEPRESSION: ICD-10-CM

## 2023-10-09 DIAGNOSIS — R11.0 NAUSEA: ICD-10-CM

## 2023-10-09 DIAGNOSIS — L02.421 FURUNCLE OF RIGHT AXILLA: ICD-10-CM

## 2023-10-09 DIAGNOSIS — F32.A ANXIETY AND DEPRESSION: ICD-10-CM

## 2023-10-09 DIAGNOSIS — F90.2 ATTENTION DEFICIT HYPERACTIVITY DISORDER (ADHD), COMBINED TYPE: Primary | ICD-10-CM

## 2023-10-09 DIAGNOSIS — F43.10 PTSD (POST-TRAUMATIC STRESS DISORDER): ICD-10-CM

## 2023-10-09 DIAGNOSIS — Z72.0 TOBACCO USE: ICD-10-CM

## 2023-10-09 DIAGNOSIS — E66.3 OVERWEIGHT (BMI 25.0-29.9): ICD-10-CM

## 2023-10-09 PROCEDURE — 99214 OFFICE O/P EST MOD 30 MIN: CPT | Performed by: FAMILY MEDICINE

## 2023-10-09 RX ORDER — DEXTROAMPHETAMINE SACCHARATE, AMPHETAMINE ASPARTATE, DEXTROAMPHETAMINE SULFATE AND AMPHETAMINE SULFATE 5; 5; 5; 5 MG/1; MG/1; MG/1; MG/1
TABLET ORAL
Qty: 60 TABLET | Refills: 0 | Status: SHIPPED | OUTPATIENT
Start: 2023-10-09

## 2023-10-09 RX ORDER — CLONAZEPAM 1 MG/1
1 TABLET ORAL 2 TIMES DAILY PRN
Qty: 30 TABLET | Refills: 0 | Status: SHIPPED | OUTPATIENT
Start: 2023-10-09

## 2023-10-09 RX ORDER — SULFAMETHOXAZOLE AND TRIMETHOPRIM 800; 160 MG/1; MG/1
1 TABLET ORAL 2 TIMES DAILY
Qty: 14 TABLET | Refills: 0 | Status: SHIPPED | OUTPATIENT
Start: 2023-10-09

## 2023-10-09 RX ORDER — ONDANSETRON HYDROCHLORIDE 8 MG/1
TABLET, FILM COATED ORAL
Qty: 30 TABLET | Refills: 1 | Status: SHIPPED | OUTPATIENT
Start: 2023-10-09

## 2023-10-09 RX ORDER — FAMOTIDINE 40 MG/1
40 TABLET, FILM COATED ORAL NIGHTLY
Qty: 30 TABLET | Refills: 12 | Status: SHIPPED | OUTPATIENT
Start: 2023-10-09

## 2023-11-28 DIAGNOSIS — F90.2 ATTENTION DEFICIT HYPERACTIVITY DISORDER (ADHD), COMBINED TYPE: ICD-10-CM

## 2023-11-29 RX ORDER — DEXTROAMPHETAMINE SACCHARATE, AMPHETAMINE ASPARTATE, DEXTROAMPHETAMINE SULFATE AND AMPHETAMINE SULFATE 5; 5; 5; 5 MG/1; MG/1; MG/1; MG/1
TABLET ORAL
Qty: 60 TABLET | Refills: 0 | Status: SHIPPED | OUTPATIENT
Start: 2023-11-29

## 2024-01-22 DIAGNOSIS — F41.9 ANXIETY AND DEPRESSION: ICD-10-CM

## 2024-01-22 DIAGNOSIS — R11.0 NAUSEA: ICD-10-CM

## 2024-01-22 DIAGNOSIS — F90.2 ATTENTION DEFICIT HYPERACTIVITY DISORDER (ADHD), COMBINED TYPE: ICD-10-CM

## 2024-01-22 DIAGNOSIS — F32.A ANXIETY AND DEPRESSION: ICD-10-CM

## 2024-01-22 DIAGNOSIS — F43.10 PTSD (POST-TRAUMATIC STRESS DISORDER): ICD-10-CM

## 2024-01-22 RX ORDER — ONDANSETRON HYDROCHLORIDE 8 MG/1
TABLET, FILM COATED ORAL
Qty: 30 TABLET | Refills: 1 | Status: SHIPPED | OUTPATIENT
Start: 2024-01-22

## 2024-01-22 RX ORDER — CLONAZEPAM 1 MG/1
1 TABLET ORAL 2 TIMES DAILY PRN
Qty: 30 TABLET | Refills: 0 | Status: SHIPPED | OUTPATIENT
Start: 2024-01-22

## 2024-01-22 RX ORDER — DEXTROAMPHETAMINE SACCHARATE, AMPHETAMINE ASPARTATE, DEXTROAMPHETAMINE SULFATE AND AMPHETAMINE SULFATE 5; 5; 5; 5 MG/1; MG/1; MG/1; MG/1
TABLET ORAL
Qty: 60 TABLET | Refills: 0 | Status: SHIPPED | OUTPATIENT
Start: 2024-01-22

## 2024-01-22 NOTE — TELEPHONE ENCOUNTER
Renewing medications as requested however patient missed appointment on January 9 and did not reschedule.  Please have him reschedule prior to additional refills.

## 2024-02-02 NOTE — PROGRESS NOTES
Chief Complaint  ADHD, Anxiety, Depression, and Heartburn    History of Present Illness  Micah Nettles presents today for ADHD, anxiety, and GERD  follow up    ADHD:  Symptoms include inattention and forgetfulness. The symptoms occur at home, at work, during social events, in the morning , in the afternoon, in the evening, and on weekends. Onset was childhood. The symptoms are described as Moderate in severity. The symptoms are described as stable. Symptoms are exacerbated by distracting activities. Symptoms are relieved by stimulant medication. Current treatment includes dextroamphetamine/amphetamine (Adderall). By report, Micah is compliant most of the time.    Anxiety/Depression  Associated symptoms include: depressed mood, anxiety, and panic attacks   Severity is described per patient :  mild-severe  Patient reports they are taking medications as prescribed and they are not having side effects.  Have not been able to get Klonipin for several month. Have 3 remaining will take for severe anxiety rarely.     Cymbalta caused fluctuating moods and nightmares  Welbutrin helped but insurance stopped paying.     GERD  Paitent complains of heartburn. This has been associated with abdominal bloating and nausea.    Symptoms have been present for several years. He denies dysphagia.  He has not lost weight. He denies melena, hematochezia, hematemesis, and coffee ground emesis.   Medical therapy in the past has included  pepcid .  Patient reports they are taking medications as prescribed and they are not having side effects.    Influenza immunization was not given due to patient refusal.    Patient Care Team:  Renée Bello MD as PCP - General (Family Medicine)  Debra Mary MD as Surgeon (General Surgery)   Current Outpatient Medications on File Prior to Visit   Medication Sig    amphetamine-dextroamphetamine (Adderall) 20 MG tablet 1 at 10 am and 1 at 2 pm    famotidine (PEPCID) 40 MG tablet Take 1 tablet  "by mouth Every Night.    ondansetron (Zofran) 8 MG tablet 1/2 to 1 tablet every 8 hours if needed    halobetasol (Ultravate) 0.05 % cream Apply  topically to the appropriate area as directed 2 (Two) Times a Day. (Patient not taking: Reported on 10/9/2023)    sulfamethoxazole-trimethoprim (Bactrim DS) 800-160 MG per tablet Take 1 tablet by mouth 2 (Two) Times a Day. (Patient not taking: Reported on 2/7/2024)     No current facility-administered medications on file prior to visit.       Objective   Vital Signs:   /80 (BP Location: Right arm, Patient Position: Sitting, Cuff Size: Large Adult)   Pulse 111   Temp 97.3 °F (36.3 °C) (Temporal)   Resp 18   Ht 182.9 cm (72\")   Wt 102 kg (225 lb 12.8 oz)   SpO2 97%   BMI 30.62 kg/m²    BP Readings from Last 3 Encounters:   02/07/24 122/80   10/09/23 136/78   08/15/23 142/95     Wt Readings from Last 3 Encounters:   02/07/24 102 kg (225 lb 12.8 oz)   10/09/23 101 kg (222 lb)   08/15/23 98 kg (216 lb)         Physical Exam  Vitals and nursing note reviewed.   Constitutional:       General: He is not in acute distress.     Appearance: He is well-developed.   Eyes:      Conjunctiva/sclera: Conjunctivae normal.      Pupils: Pupils are equal, round, and reactive to light.   Cardiovascular:      Rate and Rhythm: Normal rate and regular rhythm.      Heart sounds: No murmur heard.  Pulmonary:      Effort: Pulmonary effort is normal.      Breath sounds: No wheezing.   Musculoskeletal:         General: Normal range of motion.      Cervical back: Normal range of motion.   Skin:     General: Skin is warm and dry.   Neurological:      Mental Status: He is alert and oriented to person, place, and time.   Psychiatric:         Behavior: Behavior normal.         Thought Content: Thought content normal.         Judgment: Judgment normal.      Comments: Somewhat dysthymic            No visits with results within 1 Day(s) from this visit.   Latest known visit with results is:   Lab " "on 07/08/2023   Component Date Value Ref Range Status    WBC 07/08/2023 6.89  3.40 - 10.80 10*3/mm3 Final    RBC 07/08/2023 4.67  4.14 - 5.80 10*6/mm3 Final    Hemoglobin 07/08/2023 15.8  13.0 - 17.7 g/dL Final    Hematocrit 07/08/2023 45.1  37.5 - 51.0 % Final    MCV 07/08/2023 96.6  79.0 - 97.0 fL Final    MCH 07/08/2023 33.8 (H)  26.6 - 33.0 pg Final    MCHC 07/08/2023 35.0  31.5 - 35.7 g/dL Final    RDW 07/08/2023 14.3  12.3 - 15.4 % Final    RDW-SD 07/08/2023 50.3  37.0 - 54.0 fl Final    MPV 07/08/2023 10.1  6.0 - 12.0 fL Final    Platelets 07/08/2023 253  140 - 450 10*3/mm3 Final       No results found for: \"CHOL\", \"CHLPL\", \"TRIG\", \"HDL\", \"LDL\", \"LDLDIRECT\"  No results found for: \"TSH\", \"L9FXEVA\", \"Z7UFGHU\", \"THYROIDAB\"  No results found for: \"GLUCOSE\", \"BUN\", \"CREATININE\", \"EGFRIFNONA\", \"EGFRIFAFRI\", \"BCR\", \"K\", \"CO2\", \"CALCIUM\", \"PROTENTOTREF\", \"ALBUMIN\", \"LABIL2\", \"BILIRUBIN\", \"AST\", \"ALT\"  Lab Results   Component Value Date    WBC 6.89 07/08/2023    HGB 15.8 07/08/2023    HCT 45.1 07/08/2023    MCV 96.6 07/08/2023     07/08/2023                      Assessment and Plan    Diagnoses and all orders for this visit:    1. Attention deficit hyperactivity disorder (ADHD), combined type (Primary)  -     buPROPion XL (WELLBUTRIN XL) 150 MG 24 hr tablet; Take 1 tablet by mouth Daily. Increase to 2 daily in 1 week if needed.  Dispense: 60 tablet; Refill: 2    2. Anxiety and depression  -     buPROPion XL (WELLBUTRIN XL) 150 MG 24 hr tablet; Take 1 tablet by mouth Daily. Increase to 2 daily in 1 week if needed.  Dispense: 60 tablet; Refill: 2  -     clonazePAM (KlonoPIN) 1 MG tablet; Take 1 tablet by mouth 2 (Two) Times a Day As Needed for Anxiety. for anxiety  Dispense: 30 tablet; Refill: 2    3. Gastroesophageal reflux disease, unspecified whether esophagitis present    4. Class 1 obesity due to excess calories without serious comorbidity with body mass index (BMI) of 30.0 to 30.9 in adult    5. Tobacco " use    6. PTSD (post-traumatic stress disorder)  -     buPROPion XL (WELLBUTRIN XL) 150 MG 24 hr tablet; Take 1 tablet by mouth Daily. Increase to 2 daily in 1 week if needed.  Dispense: 60 tablet; Refill: 2  -     clonazePAM (KlonoPIN) 1 MG tablet; Take 1 tablet by mouth 2 (Two) Times a Day As Needed for Anxiety. for anxiety  Dispense: 30 tablet; Refill: 2      Depression and anxiety with PTSD with current increased stress with his dad in very poor health with a throat cancer diagnosis and [place], his employer leaving the area.  We are restarting Wellbutrin and reordering Klonopin.  Patient is currently looking for alternative employment but states no one will hire him due to previous residential time.  He states he could have the conviction expunged from his record but cannot afford the legal fees to do so.  Patient declines referral to counseling at this time.  Medications Discontinued During This Encounter   Medication Reason    clonazePAM (KlonoPIN) 1 MG tablet Reorder         Follow Up     No follow-ups on file.    Patient was given instructions and counseling regarding his condition or for health maintenance advice. Please see specific information pulled into the AVS if appropriate.

## 2024-02-07 ENCOUNTER — OFFICE VISIT (OUTPATIENT)
Dept: FAMILY MEDICINE CLINIC | Facility: CLINIC | Age: 34
End: 2024-02-07
Payer: COMMERCIAL

## 2024-02-07 VITALS
HEART RATE: 111 BPM | BODY MASS INDEX: 30.58 KG/M2 | OXYGEN SATURATION: 97 % | HEIGHT: 72 IN | TEMPERATURE: 97.3 F | SYSTOLIC BLOOD PRESSURE: 122 MMHG | WEIGHT: 225.8 LBS | RESPIRATION RATE: 18 BRPM | DIASTOLIC BLOOD PRESSURE: 80 MMHG

## 2024-02-07 DIAGNOSIS — F32.A ANXIETY AND DEPRESSION: ICD-10-CM

## 2024-02-07 DIAGNOSIS — K21.9 GASTROESOPHAGEAL REFLUX DISEASE, UNSPECIFIED WHETHER ESOPHAGITIS PRESENT: ICD-10-CM

## 2024-02-07 DIAGNOSIS — F41.9 ANXIETY AND DEPRESSION: ICD-10-CM

## 2024-02-07 DIAGNOSIS — F90.2 ATTENTION DEFICIT HYPERACTIVITY DISORDER (ADHD), COMBINED TYPE: Primary | ICD-10-CM

## 2024-02-07 DIAGNOSIS — Z72.0 TOBACCO USE: ICD-10-CM

## 2024-02-07 DIAGNOSIS — E66.09 CLASS 1 OBESITY DUE TO EXCESS CALORIES WITHOUT SERIOUS COMORBIDITY WITH BODY MASS INDEX (BMI) OF 30.0 TO 30.9 IN ADULT: ICD-10-CM

## 2024-02-07 DIAGNOSIS — F43.10 PTSD (POST-TRAUMATIC STRESS DISORDER): ICD-10-CM

## 2024-02-07 PROCEDURE — 99214 OFFICE O/P EST MOD 30 MIN: CPT | Performed by: FAMILY MEDICINE

## 2024-02-07 RX ORDER — BUPROPION HYDROCHLORIDE 150 MG/1
150 TABLET ORAL DAILY
Qty: 60 TABLET | Refills: 2 | Status: SHIPPED | OUTPATIENT
Start: 2024-02-07

## 2024-02-07 RX ORDER — CLONAZEPAM 1 MG/1
1 TABLET ORAL 2 TIMES DAILY PRN
Qty: 30 TABLET | Refills: 2 | Status: SHIPPED | OUTPATIENT
Start: 2024-02-07

## 2024-03-12 DIAGNOSIS — F90.2 ATTENTION DEFICIT HYPERACTIVITY DISORDER (ADHD), COMBINED TYPE: ICD-10-CM

## 2024-03-12 DIAGNOSIS — R11.0 NAUSEA: ICD-10-CM

## 2024-03-12 RX ORDER — DEXTROAMPHETAMINE SACCHARATE, AMPHETAMINE ASPARTATE, DEXTROAMPHETAMINE SULFATE AND AMPHETAMINE SULFATE 5; 5; 5; 5 MG/1; MG/1; MG/1; MG/1
TABLET ORAL
Qty: 60 TABLET | Refills: 0 | Status: SHIPPED | OUTPATIENT
Start: 2024-03-12

## 2024-03-12 RX ORDER — ONDANSETRON HYDROCHLORIDE 8 MG/1
TABLET, FILM COATED ORAL
Qty: 30 TABLET | Refills: 1 | Status: SHIPPED | OUTPATIENT
Start: 2024-03-12

## 2024-10-15 ENCOUNTER — HOSPITAL ENCOUNTER (EMERGENCY)
Facility: HOSPITAL | Age: 34
Discharge: HOME | End: 2024-10-15
Admitting: EMERGENCY MEDICINE
Payer: MEDICAID

## 2024-10-15 ENCOUNTER — APPOINTMENT (OUTPATIENT)
Dept: GENERAL RADIOLOGY | Facility: HOSPITAL | Age: 34
End: 2024-10-15
Payer: MEDICAID

## 2024-10-15 VITALS
SYSTOLIC BLOOD PRESSURE: 115 MMHG | WEIGHT: 229.28 LBS | DIASTOLIC BLOOD PRESSURE: 71 MMHG | HEART RATE: 106 BPM | BODY MASS INDEX: 31.05 KG/M2 | OXYGEN SATURATION: 95 % | HEIGHT: 72 IN | TEMPERATURE: 97.6 F | RESPIRATION RATE: 18 BRPM
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
## 2024-10-15 DIAGNOSIS — S20.419A ABRASION OF BACK, UNSPECIFIED LATERALITY, INITIAL ENCOUNTER: ICD-10-CM

## 2024-10-15 DIAGNOSIS — M54.6 ACUTE MIDLINE THORACIC BACK PAIN: ICD-10-CM

## 2024-10-15 PROCEDURE — 99283 EMERGENCY DEPT VISIT LOW MDM: CPT

## 2024-10-15 PROCEDURE — 72072 X-RAY EXAM THORAC SPINE 3VWS: CPT

## 2024-10-15 RX ORDER — METHOCARBAMOL 500 MG/1
500 TABLET, FILM COATED ORAL 4 TIMES DAILY PRN
Qty: 20 TABLET | Refills: 0 | Status: SHIPPED | OUTPATIENT
Start: 2024-10-15

## 2024-10-15 RX ORDER — DIAPER,BRIEF,INFANT-TODD,DISP
1 EACH MISCELLANEOUS ONCE
Status: COMPLETED | OUTPATIENT
Start: 2024-10-15 | End: 2024-10-15

## 2024-10-15 RX ORDER — HYDROCODONE BITARTRATE AND ACETAMINOPHEN 7.5; 325 MG/1; MG/1
1 TABLET ORAL ONCE
Status: COMPLETED | OUTPATIENT
Start: 2024-10-15 | End: 2024-10-15

## 2024-10-15 RX ORDER — DICLOFENAC SODIUM 75 MG/1
75 TABLET, DELAYED RELEASE ORAL 2 TIMES DAILY PRN
Qty: 20 TABLET | Refills: 0 | Status: SHIPPED | OUTPATIENT
Start: 2024-10-15

## 2024-10-15 RX ORDER — METHOCARBAMOL 500 MG/1
500 TABLET, FILM COATED ORAL ONCE
Status: COMPLETED | OUTPATIENT
Start: 2024-10-15 | End: 2024-10-15

## 2024-10-15 RX ADMIN — METHOCARBAMOL 500 MG: 500 TABLET ORAL at 19:36

## 2024-10-15 RX ADMIN — HYDROCODONE BITARTRATE AND ACETAMINOPHEN 1 TABLET: 7.5; 325 TABLET ORAL at 19:36

## 2024-10-15 RX ADMIN — BACITRACIN 0.9 G: 500 OINTMENT TOPICAL at 19:36

## 2024-10-15 NOTE — ED PROVIDER NOTES
Subjective   History of Present Illness  Patient is a 34-year-old gentleman who states he stumbled backwards and was going to sit down in a chair and he realized the child was in the chair so he tried to keep from sitting in the chair and fell backwards landing on it and has an abrasion to his thoracic spine and has pain in that region he states this happened about 2-1/2 days ago.  He states he has had increased pain over the last 2 days and was unable to work today and came in for evaluation.  He has no difficulty walking.  He has no numbness no tingling no saddle anesthesia      Review of Systems   Musculoskeletal:  Positive for back pain.   Skin:         Thoracic abrasion       Past Medical History:   Diagnosis Date    Alcoholism January 2023    Anxiety 05/16/2018    Depression 05/16/2018    GERD (gastroesophageal reflux disease) 05/16/2018    PTSD (post-traumatic stress disorder) 05/16/2018    Rectal bleeding June 1, 2023    Off and on throughout adulthood       Allergies   Allergen Reactions    Bee Venom Anaphylaxis    Celexa [Citalopram] Other (See Comments)     erectile dysfunction        Past Surgical History:   Procedure Laterality Date    COLONOSCOPY      ENDOSCOPY      RECTAL EXAMINATION UNDER ANESTHESIA N/A 8/2/2023    Procedure: Anorectal exam under anesthesia with fistulotomy;  Surgeon: Debra Mary MD;  Location: [facility] MAIN OR;  Service: General;  Laterality: N/A;       Family History   Problem Relation Age of Onset    Migraines Mother     Diabetes Mother     Alcohol abuse Father     Hypertension Father     Arthritis Father     Cervical cancer Sister     Alzheimer's disease Maternal Grandmother        Social History     Socioeconomic History    Marital status: Single   Tobacco Use    Smoking status: Every Day     Current packs/day: 1.00     Average packs/day: 1 pack/day for 18.0 years (18.0 ttl pk-yrs)     Types: Cigarettes     Start date: 1/1/2007     Passive exposure: Current    Smokeless tobacco:  "Never   Vaping Use    Vaping status: Never Used   Substance and Sexual Activity    Alcohol use: Yes     Alcohol/week: 8.0 standard drinks of alcohol     Types: 8 Shots of liquor per week     Comment: occasioanally    Drug use: No    Sexual activity: Yes     Partners: Female     Birth control/protection: Condom           Objective   Physical Exam  Vitals reviewed.   Constitutional:       General: He is not in acute distress.     Appearance: Normal appearance. He is well-developed and normal weight. He is not toxic-appearing.   HENT:      Head: Normocephalic and atraumatic.      Right Ear: External ear normal.      Left Ear: External ear normal.      Nose: Nose normal.      Mouth/Throat:      Mouth: Mucous membranes are moist.   Eyes:      Conjunctiva/sclera: Conjunctivae normal.      Pupils: Pupils are equal, round, and reactive to light.   Cardiovascular:      Rate and Rhythm: Normal rate and regular rhythm.      Heart sounds: Normal heart sounds.   Pulmonary:      Effort: Pulmonary effort is normal.      Breath sounds: Normal breath sounds.   Abdominal:      Palpations: Abdomen is soft.   Musculoskeletal:         General: Normal range of motion.      Cervical back: Normal, normal range of motion and neck supple.      Thoracic back: Tenderness present. Decreased range of motion.      Lumbar back: Normal.        Back:    Skin:     General: Skin is warm and dry.      Capillary Refill: Capillary refill takes less than 2 seconds.          Neurological:      General: No focal deficit present.      Mental Status: He is alert and oriented to person, place, and time.      GCS: GCS eye subscore is 4. GCS verbal subscore is 5. GCS motor subscore is 6.         Procedures           ED Course                                   /77   Pulse 109   Temp 97.6 °F (36.4 °C) (Oral)   Resp 18   Ht 182.9 cm (72\")   Wt 104 kg (229 lb 4.5 oz)   SpO2 96%   BMI 31.10 kg/m²   Labs Reviewed - No data to display  Medications "   bacitracin ointment 0.9 g (0.9 g Topical Given 10/15/24 1936)   methocarbamol (ROBAXIN) tablet 500 mg (500 mg Oral Given 10/15/24 1936)   HYDROcodone-acetaminophen (NORCO) 7.5-325 MG per tablet 1 tablet (1 tablet Oral Given 10/15/24 1936)                 Medical Decision Making  Patient is a 34-year-old gentleman who had above exam and the wound was cleaned bacitracin and dressing was applied.  The patient had thoracic x-ray which was read by the radiologist and reviewed by myself is essentially negative.  The patient was given Robaxin Norco here in the emergency room and was discharged home with some Robaxin Voltaren and advised to use Salonpas over-the-counter he was advised to follow-up with primary care for any further problems and was given a 2-day work note he verbalized understood discharge instructions.    Problems Addressed:  Abrasion of back, unspecified laterality, initial encounter: complicated acute illness or injury  Acute midline thoracic back pain: complicated acute illness or injury  Fall, initial encounter: complicated acute illness or injury    Amount and/or Complexity of Data Reviewed  Radiology: ordered and independent interpretation performed. Decision-making details documented in ED Course.  ECG/medicine tests: ordered and independent interpretation performed. Decision-making details documented in ED Course.    Risk  OTC drugs.  Prescription drug management.        Final diagnoses:   Fall, initial encounter   Abrasion of back, unspecified laterality, initial encounter   Acute midline thoracic back pain       ED Disposition  ED Disposition       ED Disposition   Discharge    Condition   Stable    Comment   --               Renée Bello MD  [address] [place] [address] [address] [place] [facility]  [phone]    In 3 days  If symptoms worsen, As needed         Medication List        New Prescriptions      diclofenac 75 MG EC tablet  Commonly known as: VOLTAREN  Take 1 tablet by mouth 2 (Two)  Times a Day As Needed (pain).     methocarbamol 500 MG tablet  Commonly known as: ROBAXIN  Take 1 tablet by mouth 4 (Four) Times a Day As Needed for Muscle Spasms.               Where to Get Your Medications        These medications were sent to [id number] - [place], [place] - [address] [place] [address] AT SEC OF [facility] [address] [place] [address] - [phone]  - [phone] [address], [place] [address]      Phone: [phone]   diclofenac 75 MG EC tablet  methocarbamol 500 MG tablet            Linnette Arenas, APRN  10/15/24 2030

## 2024-10-15 NOTE — Clinical Note
[facility] EMERGENCY DEPARTMENT  [address] [place] [address]  Phone: [phone]    Micah Nettles was seen and treated in our emergency department on 10/15/2024.  He may return to work on 10/18/2024.         Thank you for choosing [facility].    Ronel Patel RN

## 2024-10-15 NOTE — ED NOTES
Pt here for mid back pain; pt states Saturday he fell and landed on fold up chair; pt states pain is not getting better;

## 2024-10-16 NOTE — DISCHARGE INSTRUCTIONS
Warm compresses and static stretches to the sore area    You can use Salonpas lidocaine patches to the sore area  Use Robaxin for muscle relaxation and Voltaren as needed for discomfort do not mix with Motrin ibuprofen Advil or Aleve today your x-rays were within normal limits follow-up with primary care for any further problems or return to the ER for worsening symptoms

## 2025-07-08 ENCOUNTER — TELEPHONE (OUTPATIENT)
Dept: FAMILY MEDICINE CLINIC | Facility: CLINIC | Age: 35
End: 2025-07-08

## 2025-07-08 NOTE — TELEPHONE ENCOUNTER
Lvm and advised return call. Need to know where [facility] is located as I can only find one in [place].

## 2025-07-08 NOTE — TELEPHONE ENCOUNTER
Patient was admitted to [facility] in [facility] [place]. Attempted to call for records but they were closed. Will likely require signed medical release form. Will obtain tomorrow during patient's appt.    Fax #:  [phone]

## 2025-07-09 PROBLEM — F10.20 ALCOHOL USE DISORDER, SEVERE, DEPENDENCE: Status: ACTIVE | Noted: 2025-07-09

## 2025-07-09 NOTE — TELEPHONE ENCOUNTER
Left voicemail for medical records. Automated recording states they will return my call within 48 hours. Will attempt to contact medical records one more time before patient's appt today. Will also obtain signed records release form at appt today.

## (undated) DEVICE — MATERNITY KNIT PANTS,SEAMLESS: Brand: WINGS

## (undated) DEVICE — PENCL HND ROCKRSWTCH HOLSTR EZ CLEAN TP CRD 10FT

## (undated) DEVICE — UNDRGLV SURG BIOGEL PIMICROINDICATOR SYNTH SZ7.5PF STRL PR/2

## (undated) DEVICE — GAUZE,SPONGE,4"X4",16PLY,XRAY,STRL,LF: Brand: MEDLINE

## (undated) DEVICE — GAUZE SPONGES,8 PLY: Brand: CURITY

## (undated) DEVICE — SYR 10ML

## (undated) DEVICE — SLV SCD CALF HEMOFORCE DVT THERP REPROC MD

## (undated) DEVICE — SUT GUT CHRM SH1 27IN

## (undated) DEVICE — KT SURG TURNOVER 050

## (undated) DEVICE — CVR HNDL LT SURG ACCSSRY BLU STRL

## (undated) DEVICE — PK MINOR LITHOTOMY 50

## (undated) DEVICE — SYR LL TP 10ML STRL

## (undated) DEVICE — TBG PENCL TELESCP MEGADYNE SMOKE EVAC 15FT

## (undated) DEVICE — TP SXN YANKR BULB STRL

## (undated) DEVICE — GLV SURG SENSICARE SLT PF LF 6 STRL

## (undated) DEVICE — NDL HYPO ECLPS SFTY 22G 1 1/2IN